# Patient Record
Sex: FEMALE | Race: WHITE | HISPANIC OR LATINO | Employment: OTHER | URBAN - METROPOLITAN AREA
[De-identification: names, ages, dates, MRNs, and addresses within clinical notes are randomized per-mention and may not be internally consistent; named-entity substitution may affect disease eponyms.]

---

## 2023-11-01 ENCOUNTER — OFFICE VISIT (OUTPATIENT)
Dept: PRIMARY CARE | Facility: CLINIC | Age: 73
End: 2023-11-01
Payer: MEDICARE

## 2023-11-01 VITALS
RESPIRATION RATE: 18 BRPM | SYSTOLIC BLOOD PRESSURE: 148 MMHG | HEART RATE: 83 BPM | HEIGHT: 62 IN | OXYGEN SATURATION: 95 % | BODY MASS INDEX: 44.16 KG/M2 | WEIGHT: 240 LBS | DIASTOLIC BLOOD PRESSURE: 88 MMHG | TEMPERATURE: 97.8 F

## 2023-11-01 DIAGNOSIS — Z12.31 ENCOUNTER FOR SCREENING MAMMOGRAM FOR BREAST CANCER: Primary | ICD-10-CM

## 2023-11-01 DIAGNOSIS — I10 PRIMARY HYPERTENSION: ICD-10-CM

## 2023-11-01 DIAGNOSIS — N28.9 RENAL INSUFFICIENCY: ICD-10-CM

## 2023-11-01 DIAGNOSIS — E66.01 CLASS 3 SEVERE OBESITY DUE TO EXCESS CALORIES WITH SERIOUS COMORBIDITY AND BODY MASS INDEX (BMI) OF 40.0 TO 44.9 IN ADULT (MULTI): ICD-10-CM

## 2023-11-01 DIAGNOSIS — R73.9 HYPERGLYCEMIA: ICD-10-CM

## 2023-11-01 DIAGNOSIS — Z13.220 LIPID SCREENING: ICD-10-CM

## 2023-11-01 DIAGNOSIS — Z00.00 ROUTINE GENERAL MEDICAL EXAMINATION AT HEALTH CARE FACILITY: ICD-10-CM

## 2023-11-01 DIAGNOSIS — Z12.11 SCREENING FOR COLON CANCER: ICD-10-CM

## 2023-11-01 PROBLEM — M10.9 GOUT: Status: ACTIVE | Noted: 2023-11-01

## 2023-11-01 PROBLEM — R51.9 HEADACHE: Status: ACTIVE | Noted: 2017-07-21

## 2023-11-01 PROBLEM — E55.9 VITAMIN D DEFICIENCY: Status: ACTIVE | Noted: 2023-11-01

## 2023-11-01 PROBLEM — M54.16 LUMBAR RADICULOPATHY: Status: ACTIVE | Noted: 2023-11-01

## 2023-11-01 PROBLEM — B37.2 MONILIASIS SKIN: Status: ACTIVE | Noted: 2023-11-01

## 2023-11-01 PROBLEM — M1A.00X0 CHRONIC GOUTY ARTHROPATHY: Status: ACTIVE | Noted: 2023-11-01

## 2023-11-01 PROBLEM — H52.13 MYOPIA WITH PRESBYOPIA OF BOTH EYES: Status: ACTIVE | Noted: 2019-04-09

## 2023-11-01 PROBLEM — H43.811 POSTERIOR VITREOUS DETACHMENT OF RIGHT EYE: Status: ACTIVE | Noted: 2019-04-09

## 2023-11-01 PROBLEM — K42.9 UMBILICAL HERNIA: Status: ACTIVE | Noted: 2023-11-01

## 2023-11-01 PROBLEM — Z90.49 ACQUIRED ABSENCE OF OTHER SPECIFIED PARTS OF DIGESTIVE TRACT: Status: ACTIVE | Noted: 2017-07-21

## 2023-11-01 PROBLEM — L72.0 EPIDERMAL INCLUSION CYST: Status: ACTIVE | Noted: 2023-11-01

## 2023-11-01 PROBLEM — R92.8 ABNORMAL MAMMOGRAM: Status: ACTIVE | Noted: 2023-11-01

## 2023-11-01 PROBLEM — H52.4 MYOPIA WITH PRESBYOPIA OF BOTH EYES: Status: ACTIVE | Noted: 2019-04-09

## 2023-11-01 PROBLEM — M1A.9XX0 CHRONIC GOUTY ARTHROPATHY: Status: ACTIVE | Noted: 2023-11-01

## 2023-11-01 PROBLEM — L01.02 PUSTULAR FOLLICULITIS: Status: ACTIVE | Noted: 2023-11-01

## 2023-11-01 PROBLEM — R03.0 ELEVATED BP WITHOUT DIAGNOSIS OF HYPERTENSION: Status: ACTIVE | Noted: 2019-04-30

## 2023-11-01 PROCEDURE — G0439 PPPS, SUBSEQ VISIT: HCPCS | Performed by: PHYSICIAN ASSISTANT

## 2023-11-01 PROCEDURE — 3079F DIAST BP 80-89 MM HG: CPT | Performed by: PHYSICIAN ASSISTANT

## 2023-11-01 PROCEDURE — 1159F MED LIST DOCD IN RCRD: CPT | Performed by: PHYSICIAN ASSISTANT

## 2023-11-01 PROCEDURE — 3077F SYST BP >= 140 MM HG: CPT | Performed by: PHYSICIAN ASSISTANT

## 2023-11-01 PROCEDURE — G0444 DEPRESSION SCREEN ANNUAL: HCPCS | Performed by: PHYSICIAN ASSISTANT

## 2023-11-01 PROCEDURE — 3008F BODY MASS INDEX DOCD: CPT | Performed by: PHYSICIAN ASSISTANT

## 2023-11-01 PROCEDURE — 1170F FXNL STATUS ASSESSED: CPT | Performed by: PHYSICIAN ASSISTANT

## 2023-11-01 PROCEDURE — 1160F RVW MEDS BY RX/DR IN RCRD: CPT | Performed by: PHYSICIAN ASSISTANT

## 2023-11-01 PROCEDURE — 99214 OFFICE O/P EST MOD 30 MIN: CPT | Performed by: PHYSICIAN ASSISTANT

## 2023-11-01 PROCEDURE — 1036F TOBACCO NON-USER: CPT | Performed by: PHYSICIAN ASSISTANT

## 2023-11-01 PROCEDURE — 99397 PER PM REEVAL EST PAT 65+ YR: CPT | Performed by: PHYSICIAN ASSISTANT

## 2023-11-01 ASSESSMENT — ACTIVITIES OF DAILY LIVING (ADL)
GROCERY_SHOPPING: INDEPENDENT
TAKING_MEDICATION: INDEPENDENT
DRESSING: INDEPENDENT
MANAGING_FINANCES: INDEPENDENT
BATHING: INDEPENDENT
DOING_HOUSEWORK: INDEPENDENT

## 2023-11-01 ASSESSMENT — PATIENT HEALTH QUESTIONNAIRE - PHQ9
1. LITTLE INTEREST OR PLEASURE IN DOING THINGS: NOT AT ALL
SUM OF ALL RESPONSES TO PHQ9 QUESTIONS 1 AND 2: 0
2. FEELING DOWN, DEPRESSED OR HOPELESS: NOT AT ALL

## 2023-11-01 ASSESSMENT — ENCOUNTER SYMPTOMS
DEPRESSION: 0
LOSS OF SENSATION IN FEET: 0
OCCASIONAL FEELINGS OF UNSTEADINESS: 0

## 2023-11-01 NOTE — PROGRESS NOTES
"   Subjective   Reason for Visit: Vicenta Rondon is a 73 y.o. female here for a Medicare Wellness visit.     CHECKLIST REVIEWED AND COMPLETE FOR AMW    Past Medical, Surgical, and Family History reviewed and updated in chart.    Reviewed all medications by prescribing practitioner or clinical pharmacist (such as prescriptions, OTCs, herbal therapies and supplements) and documented in the medical record.  Establish Care (New pt here toady to [facility]; previously seen PCP at [facility] but can't remember name-3 years ago. Pt had cyst on back and she went to ER on 8/31/23. Pt's Mother had Uterus Cancer but still living; pt not having any symptoms. Pt had previous biopsy on lump of left breast and needing a Mamm order; last one 3 years ago. Pt has gout in her feet. Pt has fracture in right butt cheek and right sided pinched nerve. )    HPI     HTN:   - BP today: 160/90 mmHg   - Current meds: None   Medication history: She has never been on blood pressure medications, in 2015 doctor recommended blood pressure medications but she declined.    - Monitoring BP at home: She says she checks it at home and it is normally 120-150 systolic, 70s diastolic. Has not recorded BP consistently.  - Caffeine intake: none  - NSAID use: none  - Sodium intake: she does not keep track of sodium intake, but states her diet is \"not great,\" eats out a lot, mother makes her lots of meals  - Activity level: pinched nerve causes her pain and limits her activity level, she is trying to increase her walking   - Complications?: Pt denies orthostatic hypotension, chest pains, palpitations, dizziness, SOB    L Ear Fullness  -Patient reports her L ear feeling \"full\" for the past month. She feels like there is water in it. She denies fever, chills, ear pain, vertigo, hearing loss, or tinnitus. She denies any issues regarding her R ear.    Gout  -stable, patient reports flairs occur only when she eats shrimp     Medicare Wellness:  - Lives in [place] at home " with daughter and son in law   - Good relationship with/ supported by daughter    - Exercise: pinched nerve gives her trouble and limits her exercise ability. She does not exercise regularly but is trying to increase her walking  - Diet: not great, 2 meals per day, mainly eats out.   - Sexual hx: not sexually active currently  - Tobacco: no  - Alcohol: no  - Illicit drugs: no  - Depression/ mood: overall happy  - Cognitive: stable  - Hearing: stable  - Falls: none  - Healthcare POA/ advanced directives: her 3 children  - Code status desired: DNR  - Labs: DUE, ordered today  - DEXA: DUE, declined   - Colon CA: DUE, Cologuard 4 years ago, ordered today  - Mamm: DUE, ordered today  - Flu: DUE, declined  - Shingrix: DUE, declined  - Pneumovax/ Prevnar: DUE, declined  - Td: UTD, due 2025  - COVID-19 vax: DUE, declined   Eye exams: UTD, looking for a new doctor  Dental: DUE, has a cracked tooth currently, needs to find a dentist, she reports brushing 1-2 per day and flossing     Patient Self Assessment of Health Status  - Patient Self Assessment: patient feels healthy     Social support:  - Lives in [place] at home with daughter and son in law   - Good relationship with/ supported by daughter     Nutrition and Exercise  - Current Diet: not great, 2 meals per day, mainly eats out.   - Adequate Fluid Intake: trying to drink more water, limits sugary drinks but she likes her Pepsi  - Caffeine: no  - Alcohol: no  - Exercise Frequency:  pinched nerve gives her trouble and limits her exercise ability. She does not exercise regularly but is trying to increase her walking  Mood: overall happy     Functional Ability/Level of Safety  - Cognitive Impairment Observed? No    Home Safety Risk Factors? Denies     Patient Care Team:  Shasta Greer PA-C as PCP - General (Family Medicine)     HPI  Patient Active Problem List   Diagnosis    Abnormal mammogram    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    Chronic gouty  "arthropathy    Epidermal inclusion cyst    Gout    Headache    Internal hemorrhoids    Lumbar radiculopathy    Moniliasis skin    Myopia with presbyopia of both eyes    Posterior vitreous detachment of right eye    Pustular folliculitis    Umbilical hernia    Vitamin D deficiency    Primary hypertension    Renal insufficiency    Class 3 severe obesity due to excess calories with serious comorbidity and body mass index (BMI) of 40.0 to 44.9 in adult (CMS/[facility])      Current Outpatient Medications   Medication Instructions    ascorbic acid (VITAMIN C ORAL) oral, Daily    cholecalciferol, vitamin D3, (VITAMIN D3 ORAL) oral, Daily    GARLIC ORAL oral, Daily    ZINC ACETATE ORAL Mouth/Throat, Daily      Past Surgical History:   Procedure Laterality Date    CATARACT EXTRACTION Bilateral     Left: 5/20/2019 and right 6/3/2019    CHOLECYSTECTOMY      Cholecystectomy Laparoscopic    CYST REMOVAL         Review of Systems  This patient has   NO history of recent Covid nor flu symptoms,  NO Fever nor chills,  NO Chest pain, shortness of breath nor paroxysmal nocturnal dyspnea,  NO Nausea, vomiting, nor diarrhea,  NO Hematochezia nor melena,  NO Dysuria, hematuria, nor new incontinence issues  NO new severe headaches nor neurological complaints,  NO new issues with anxiety nor depression nor new psychiatric complaints,  NO suicidal nor homicidal ideations.     OBJECTIVE:  /88   Pulse 83   Temp 36.6 °C (97.8 °F)   Resp 18   Ht 1.562 m (5' 1.5\")   Wt 109 kg (240 lb)   SpO2 95%   BMI 44.61 kg/m²      General:  alert, oriented, good hygiene, not disheveled. Well nourished.No obvious skin rashes noted.   Normal gait     Mood is pleasant, not tearful, no signs of emotional distress.  Not appearing intoxicated or altered.   No voiced delusions or paranoia,    Normal, appropriate behavior.    HEENT: Normocephalic, atraumatic,   Pupils round, reactive to light  Extraocular motions intact and wnl  Tympanic membranes " normal    Neck: No masses palpable.  No carotid bruits.  No thyromegaly.    Respiratory: Equal breath sounds  No wheezes, rales, rhonchi  No respiratory distress.    Heart: Regular rate and rhythm, no murmurs      Abdomen: no masses palpable, no tenderness, rebound nor guarding. Ventral hernia, non-tender, easily reducible     Extremities:   B/l lower extremity edema, 2+ pitting   2+dorsalis pedis pulses.       Assessment/Plan     Healthy diet was encouraged  Regular, gentle exercise encouraged   High fall risk, recommended cane/ supportive device with walking.  DUE for final PAP smear - advised she schedule appt   DUE for mamm - ordered today   DUE for colon ca screen - Cologuard ordered today   DUE for DEXA - pt DECLINED  DUE for Pneumovax - DECLINED  DUE for Flu vax - DECLINED   DUE for Shingrix - DECLINED   DUE for COVID-19 vax - DECLINED     Problem List Items Addressed This Visit       Primary hypertension     PRIMARY HYPERTENSION:  - BP in the office today is 148/88 mmHg. This has been a trend for the pt.   - I educated the patient extensively about HTN including the pathophysiology, risks/ complications and treatment goals.   - Pt declines medicien at this point but agrees to keep a log of home BP readings and follow up with me.   - Pt encouraged to monitor BPs at home with automated BP cuff and keep a log. If BPs consistently elevated (>120/>80), consider medicine.  - Pt encouraged to make lifestyle changes such as: reduce salt in the diet, reduce fatty foods, red meat, carbs, sweets, and increase fruits, vegetables, fish, and whole grains.  Reduce NSAID use. Increase physical activity, particularly cardiovascular exercise at least 30 minutes daily. Reduce alcohol intake to small amounts in moderation and avoid cigarette smoking.   - Pt advised to f/u for reevaluation in 1 month.          Relevant Orders    Comprehensive Metabolic Panel    Albumin , Urine Random    Renal insufficiency     - Found  incidentally when she was at the hospital for an abscess. Unsure if chronic issue or was ROSETTA   - Needs repeat blood work - orders placed  - Encouraged more water/ better hydration          Relevant Orders    Comprehensive Metabolic Panel    Albumin , Urine Random    Class 3 severe obesity due to excess calories with serious comorbidity and body mass index (BMI) of 40.0 to 44.9 in adult (CMS/[facility])     OBESITY  - BMI is 44.61 kg/m2  - Other cardiac risk factors: HTN, hyperglycemia  - Pt is aware of the health benefits of lower body weight and the risks associated with obesity.   - Pt advised to increase physical activity with a goal of 30 minutes of cardiovascular/ aerobic exercise daily.   - Pt advised to improve the diet - less fatty foods, red meat and sweets,  more fruits, vegetables, fish, and whole grains.   - All the pt's questions were answered. The pt expressed understanding and agreed to the plan.           Other Visit Diagnoses       Encounter for screening mammogram for breast cancer    -  Primary    Relevant Orders    BI mammo bilateral screening tomosynthesis    Screening for colon cancer        Relevant Orders    Cologuard® colon cancer screening    Lipid screening        Relevant Orders    Lipid Panel    Hyperglycemia        Relevant Orders    Hemoglobin A1C    Routine general medical examination at health care facility        Relevant Orders    1 Year Follow Up In [facility] - PCP - Wellness Exam            Hypertension  -Risks of elevated BP were discussed with patient, eg MI, CHF, CVA  -Treatment options were discussed, including medication, weight loss, exercise, sodium restriction, increased water intake, etc.  - Pt DECLINED any medication for now and prefers to try lifestyle modification   -Patient was advised to record BP readings at home for a consecutive 2 weeks and schedule another appointment to discuss the results    L ear fullness  -Cerumen impaction likely causing symptoms of  ear fullness  -Debrox or peroxide/water mixture was recommended to patient for softening of cerumen  -Will continue to monitor for symptoms at next visit    Hyperglycemia  -Recent result of elevated glucose (200) was discussed with patient, which was from a recent hospital visit for an abscess.   -Elevated glucose may be attributed to the recent skin infection she had or may be more chronic glucose elevation  -Hemoglobin A1C ordered today    Renal Insufficiency  -GFR of 41 was recorded during her recent hospital visit  -Repeat CMP and an urine albumin were ordered today      Follow up at least annually -sooner if condition deteriorates or new problems arise.  Patient was identified as a fall risk. Risk prevention instructions provided.

## 2023-11-01 NOTE — PROGRESS NOTES
Subjective   Reason for Visit: Vicenta Rondon is a 73 y.o. female here for a Medicare Wellness visit.     CHECKLIST REVIEWED AND COMPLETE FOR AMW    Past Medical, Surgical, and Family History reviewed and updated in chart.    Reviewed all medications by prescribing practitioner or clinical pharmacist (such as prescriptions, OTCs, herbal therapies and supplements) and documented in the medical record.  Establish Care (New pt here [facility] to [facility]; previously seen PCP at [facility] but can't remember name-3 years ago. Pt had cyst on back and she went to ER on 8/31/23. Pt's Mother had Uterus Cancer but still living; pt not having any symptoms. Pt had previous biopsy on lump of left breast and needing a Mamm order; last one 3 years ago. Pt has gout in her feet. Pt has fracture in right butt cheek and right sided pinched nerve. )    HPI    HTN:   - BP today: 160/90 mmHg   - Current meds:   - Compliant:   - Monitoring BP at home:   - Caffeine intake:   - NSAID use:   - Sodium intake:  - Activity level:   - Complications?: Pt denies orthostatic hypotension, chest pains, palpitations, dizziness, SOB, lower extremity edema     Medicare Wellness:  - Lives at home in -- with --   - Hobbies/ activities:   - Exercise:   - Diet:   - Sexual hx:   - Tobacco:   - Alcohol:  - Illicit drugs:   - Depression/ mood:   - Cognitive:   - Hearing:   - Falls:   - Healthcare POA/ advanced directives:  - Code status desired:   - Labs:   - DEXA:   - Colon CA:  - Mamm:  - Flu:  - Shingrix:  - Pneumovax/ Prevnar:  - Td:  - COVID-19 vax:   - Lung CA screen (Smoker):                   Patient Self Assessment of Health Status  - Patient Self Assessment: ***     Social support:  - Lives *** with ***   - Good relationship with/ supported by ***     Nutrition and Exercise  - Current Diet: ***  - Adequate Fluid Intake: ***  - Caffeine: ***  - Alcohol: ***  - Exercise Frequency: ***    Functional Ability/Level of Safety  - Cognitive Impairment Observed?  "***    Home Safety Risk Factors? ***    Patient Care Team:  Shasta Greer PA-C as PCP - General (Family Medicine)     HPI  Patient Active Problem List   Diagnosis    Abnormal mammogram    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    Chronic gouty arthropathy    Elevated BP without diagnosis of hypertension    Epidermal inclusion cyst    Gout    Headache    Internal hemorrhoids    Lumbar radiculopathy    Moniliasis skin    Myopia with presbyopia of both eyes    Posterior vitreous detachment of right eye    Pustular folliculitis    Umbilical hernia    Vitamin D deficiency      Current Outpatient Medications   Medication Instructions    ascorbic acid (VITAMIN C ORAL) oral, Daily    cholecalciferol, vitamin D3, (VITAMIN D3 ORAL) oral, Daily    GARLIC ORAL oral, Daily    ZINC ACETATE ORAL Mouth/Throat, Daily      Past Surgical History:   Procedure Laterality Date    CATARACT EXTRACTION Bilateral     Left: 5/20/2019 and right 6/3/2019    CHOLECYSTECTOMY      Cholecystectomy Laparoscopic    CYST REMOVAL         Review of Systems  This patient has   NO history of recent Covid nor flu symptoms,  NO Fever nor chills,  NO Chest pain, shortness of breath nor paroxysmal nocturnal dyspnea,  NO Nausea, vomiting, nor diarrhea,  NO Hematochezia nor melena,  NO Dysuria, hematuria, nor new incontinence issues  NO new severe headaches nor neurological complaints,  NO new issues with anxiety nor depression nor new psychiatric complaints,  NO suicidal nor homicidal ideations.     OBJECTIVE:  /90   Pulse 83   Temp 36.6 °C (97.8 °F)   Resp 18   Ht 1.562 m (5' 1.5\")   Wt 109 kg (240 lb)   SpO2 95%   BMI 44.61 kg/m²      General:  alert, oriented, good hygiene, not disheveled. Well nourished. *** thin, frail, weak/ ill-appearing  No obvious skin rashes noted.   Antalgic gait ***(not) observed     Mood is pleasant, not tearful, no signs of emotional distress.  Not appearing intoxicated or altered.   No voiced " delusions or paranoia,    Normal, appropriate behavior.    HEENT: Normocephalic, atraumatic,   Pupils round, reactive to light  Extraocular motions intact and wnl  Tympanic membranes normal    Neck: No masses palpable.  No carotid bruits.  No thyromegaly.    Respiratory: Equal breath sounds  No wheezes, rales, rhonchi  No respiratory distress.    Heart: Regular rate and rhythm, no *** murmurs      Abdomen: no masses palpable, no tenderness, rebound nor guarding. *** no hernia or surgical scars    Extremities:   *** B/l lower extremity edema *** No edema   2+dorsalis pedis pulses.       Assessment/Plan     Healthy diet was encouraged  Regular, gentle exercise encouraged       Problem List Items Addressed This Visit    None    Advance Care Planning Note   Discussion Date: 11/01/23   Discussion Participants: patient    The patient wishes to discuss Advance Care Planning today and the following is a brief summary of our discussion.     Patient has capacity to make their own medical decisions: Yes  Health Care Agent/Surrogate Decision Maker documented in chart: Yes  Documents on file and valid:  Advance Directive/Living Will: Yes   Health Care Power of : Yes  Communication of Medical Status/Prognosis:   yes   Communication of Treatment Goals/Options:   yes  Treatment Decisions  yes  Time Statement: Total face to face time spent on advance care planning was <30 minutes with <30 minutes spent in counseling, including the explanation.    Follow up at least annually -sooner if condition deteriorates or new problems arise.

## 2023-11-01 NOTE — ASSESSMENT & PLAN NOTE
-Risks of elevated BP were discussed with patient, eg MI, CHF, CVA  -Treatment options were discussed, including medication, weight loss, exercise, sodium restriction, increased water intake, etc.  -Patient was advised to record BP readings at home for a consecutive 2 weeks and schedule another appointment with me to discuss the results

## 2023-11-02 PROBLEM — E66.01 CLASS 3 SEVERE OBESITY DUE TO EXCESS CALORIES WITH SERIOUS COMORBIDITY AND BODY MASS INDEX (BMI) OF 40.0 TO 44.9 IN ADULT (MULTI): Status: ACTIVE | Noted: 2023-11-02

## 2023-11-02 PROBLEM — E66.813 CLASS 3 SEVERE OBESITY DUE TO EXCESS CALORIES WITH SERIOUS COMORBIDITY AND BODY MASS INDEX (BMI) OF 40.0 TO 44.9 IN ADULT: Status: ACTIVE | Noted: 2023-11-02

## 2023-11-02 PROBLEM — N28.9 RENAL INSUFFICIENCY: Status: ACTIVE | Noted: 2023-11-02

## 2023-11-02 PROBLEM — R03.0 ELEVATED BP WITHOUT DIAGNOSIS OF HYPERTENSION: Status: RESOLVED | Noted: 2019-04-30 | Resolved: 2023-11-02

## 2023-11-02 NOTE — ASSESSMENT & PLAN NOTE
- Found incidentally when she was at the hospital for an abscess. Unsure if chronic issue or was ROSETTA   - Needs repeat blood work - orders placed  - Encouraged more water/ better hydration

## 2023-11-02 NOTE — PATIENT INSTRUCTIONS
Current weight: 109 kg (240 lb)  Weight change since last visit (-) denotes wt loss 240 lbs   Weight loss needed to achieve BMI 25: 105.8 Lbs  Weight loss needed to achieve BMI 30: 78.9 Lbs          Ways to Help Prevent Falls at Home    Quick Tips   ? Ask for help if you need it. Most people want to help!   ? Get up slowly after sitting or laying down   ? Wear a medical alert device or keep cell phone in your pocket   ? Use night lights, especially areas near a bathroom   ? Keep the items you use often within reach on a small stool or end table   ? Use an assistive device such as walker or cane, as directed by provider/physical therapy   ? Use a non-slip mat and grab bars in your bathroom. Look for home health sections for best options     Other Areas to Focus On   ? Exercise and nutrition: Regular exercise or taking a falls prevention class are great ways improve strength and balance. Don’t forget to stay hydrated and bring a snack!   ? Medicine side effects: Some medicines can make you sleepy or dizzy, which could cause a fall. Ask your healthcare provider about the side effects your medicines could cause. Be sure to let them know if you take any vitamins or supplements as well.   ? Tripping hazards: Remove items you could trip on, such as loose mats, rugs, cords, and clutter. Wear closed toe shoes with rubber soles.   ? Health and wellness: Get regular checkups with your healthcare provider, plus routine vision and hearing screenings. Talk with your healthcare provider about:   o Your medicines and the possible side effects - bring them in a bag if that is easier!   o Problems with balance or feeling dizzy   o Ways to promote bone health, such as Vitamin D and calcium supplements   o Questions or concerns about falling     *Ask your healthcare team if you have questions     [facility], 2022

## 2023-11-02 NOTE — ASSESSMENT & PLAN NOTE
OBESITY  - BMI is 44.61 kg/m2  - Other cardiac risk factors: HTN, hyperglycemia  - Pt is aware of the health benefits of lower body weight and the risks associated with obesity.   - Pt advised to increase physical activity with a goal of 30 minutes of cardiovascular/ aerobic exercise daily.   - Pt advised to improve the diet - less fatty foods, red meat and sweets,  more fruits, vegetables, fish, and whole grains.   - All the pt's questions were answered. The pt expressed understanding and agreed to the plan.

## 2023-11-02 NOTE — ASSESSMENT & PLAN NOTE
PRIMARY HYPERTENSION:  - BP in the office today is 148/88 mmHg. This has been a trend for the pt.   - I educated the patient extensively about HTN including the pathophysiology, risks/ complications and treatment goals.   - Pt declines medicien at this point but agrees to keep a log of home BP readings and follow up with me.   - Pt encouraged to monitor BPs at home with automated BP cuff and keep a log. If BPs consistently elevated (>120/>80), consider medicine.  - Pt encouraged to make lifestyle changes such as: reduce salt in the diet, reduce fatty foods, red meat, carbs, sweets, and increase fruits, vegetables, fish, and whole grains.  Reduce NSAID use. Increase physical activity, particularly cardiovascular exercise at least 30 minutes daily. Reduce alcohol intake to small amounts in moderation and avoid cigarette smoking.   - Pt advised to f/u for reevaluation in 1 month.

## 2023-11-23 LAB — NONINV COLON CA DNA+OCC BLD SCRN STL QL: NEGATIVE

## 2024-01-06 ENCOUNTER — LAB (OUTPATIENT)
Dept: LAB | Facility: LAB | Age: 74
End: 2024-01-06
Payer: MEDICARE

## 2024-01-06 DIAGNOSIS — N28.9 RENAL INSUFFICIENCY: ICD-10-CM

## 2024-01-06 DIAGNOSIS — Z13.220 LIPID SCREENING: ICD-10-CM

## 2024-01-06 DIAGNOSIS — I10 PRIMARY HYPERTENSION: ICD-10-CM

## 2024-01-06 DIAGNOSIS — R73.9 HYPERGLYCEMIA: ICD-10-CM

## 2024-01-06 LAB
ALBUMIN SERPL BCP-MCNC: 3.8 G/DL (ref 3.4–5)
ALP SERPL-CCNC: 85 U/L (ref 33–136)
ALT SERPL W P-5'-P-CCNC: 13 U/L (ref 7–45)
ANION GAP SERPL CALC-SCNC: 13 MMOL/L (ref 10–20)
AST SERPL W P-5'-P-CCNC: 12 U/L (ref 9–39)
BILIRUB SERPL-MCNC: 0.6 MG/DL (ref 0–1.2)
BUN SERPL-MCNC: 12 MG/DL (ref 6–23)
CALCIUM SERPL-MCNC: 8.7 MG/DL (ref 8.6–10.3)
CHLORIDE SERPL-SCNC: 103 MMOL/L (ref 98–107)
CHOLEST SERPL-MCNC: 207 MG/DL (ref 0–199)
CHOLESTEROL/HDL RATIO: 4.2
CO2 SERPL-SCNC: 28 MMOL/L (ref 21–32)
CREAT SERPL-MCNC: 1.02 MG/DL (ref 0.5–1.05)
CREAT UR-MCNC: 172.3 MG/DL (ref 20–320)
EST. AVERAGE GLUCOSE BLD GHB EST-MCNC: 180 MG/DL
GFR SERPL CREATININE-BSD FRML MDRD: 59 ML/MIN/1.73M*2
GLUCOSE SERPL-MCNC: 155 MG/DL (ref 74–99)
HBA1C MFR BLD: 7.9 %
HDLC SERPL-MCNC: 49 MG/DL
LDLC SERPL CALC-MCNC: 133 MG/DL
MICROALBUMIN UR-MCNC: 266.3 MG/L
MICROALBUMIN/CREAT UR: 154.6 UG/MG CREAT
NON HDL CHOLESTEROL: 158 MG/DL (ref 0–149)
POTASSIUM SERPL-SCNC: 4.1 MMOL/L (ref 3.5–5.3)
PROT SERPL-MCNC: 7.1 G/DL (ref 6.4–8.2)
SODIUM SERPL-SCNC: 140 MMOL/L (ref 136–145)
TRIGL SERPL-MCNC: 124 MG/DL (ref 0–149)
VLDL: 25 MG/DL (ref 0–40)

## 2024-01-06 PROCEDURE — 80053 COMPREHEN METABOLIC PANEL: CPT

## 2024-01-06 PROCEDURE — 82570 ASSAY OF URINE CREATININE: CPT

## 2024-01-06 PROCEDURE — 82043 UR ALBUMIN QUANTITATIVE: CPT

## 2024-01-06 PROCEDURE — 36415 COLL VENOUS BLD VENIPUNCTURE: CPT

## 2024-01-06 PROCEDURE — 83036 HEMOGLOBIN GLYCOSYLATED A1C: CPT

## 2024-01-06 PROCEDURE — 80061 LIPID PANEL: CPT

## 2024-01-08 ENCOUNTER — PROCEDURE VISIT (OUTPATIENT)
Dept: PRIMARY CARE | Facility: CLINIC | Age: 74
End: 2024-01-08
Payer: MEDICARE

## 2024-01-08 ENCOUNTER — TELEPHONE (OUTPATIENT)
Dept: PRIMARY CARE | Facility: CLINIC | Age: 74
End: 2024-01-08

## 2024-01-08 VITALS
BODY MASS INDEX: 44.35 KG/M2 | WEIGHT: 241 LBS | HEIGHT: 62 IN | SYSTOLIC BLOOD PRESSURE: 162 MMHG | OXYGEN SATURATION: 96 % | TEMPERATURE: 97.8 F | DIASTOLIC BLOOD PRESSURE: 84 MMHG | HEART RATE: 94 BPM | RESPIRATION RATE: 18 BRPM

## 2024-01-08 DIAGNOSIS — N18.2 TYPE 2 DIABETES MELLITUS WITH STAGE 2 CHRONIC KIDNEY DISEASE, WITHOUT LONG-TERM CURRENT USE OF INSULIN (MULTI): ICD-10-CM

## 2024-01-08 DIAGNOSIS — E11.69 HYPERLIPIDEMIA ASSOCIATED WITH TYPE 2 DIABETES MELLITUS (MULTI): Primary | ICD-10-CM

## 2024-01-08 DIAGNOSIS — Z12.4 CERVICAL CANCER SCREENING: ICD-10-CM

## 2024-01-08 DIAGNOSIS — E78.5 HYPERLIPIDEMIA ASSOCIATED WITH TYPE 2 DIABETES MELLITUS (MULTI): Primary | ICD-10-CM

## 2024-01-08 DIAGNOSIS — N18.2 CKD STAGE G2/A2, GFR 60-89 AND ALBUMIN CREATININE RATIO 30-299 MG/G: ICD-10-CM

## 2024-01-08 DIAGNOSIS — I15.2 HYPERTENSION ASSOCIATED WITH TYPE 2 DIABETES MELLITUS (MULTI): ICD-10-CM

## 2024-01-08 DIAGNOSIS — E11.59 HYPERTENSION ASSOCIATED WITH TYPE 2 DIABETES MELLITUS (MULTI): ICD-10-CM

## 2024-01-08 DIAGNOSIS — I10 PRIMARY HYPERTENSION: ICD-10-CM

## 2024-01-08 DIAGNOSIS — E11.22 TYPE 2 DIABETES MELLITUS WITH STAGE 2 CHRONIC KIDNEY DISEASE, WITHOUT LONG-TERM CURRENT USE OF INSULIN (MULTI): ICD-10-CM

## 2024-01-08 DIAGNOSIS — E11.65 TYPE 2 DIABETES MELLITUS WITH HYPERGLYCEMIA, WITHOUT LONG-TERM CURRENT USE OF INSULIN (MULTI): ICD-10-CM

## 2024-01-08 DIAGNOSIS — Z12.4 PAP SMEAR FOR CERVICAL CANCER SCREENING: ICD-10-CM

## 2024-01-08 PROBLEM — Z00.00 ANNUAL PHYSICAL EXAM: Status: ACTIVE | Noted: 2024-01-08

## 2024-01-08 PROCEDURE — 87624 HPV HI-RISK TYP POOLED RSLT: CPT

## 2024-01-08 PROCEDURE — 88175 CYTOPATH C/V AUTO FLUID REDO: CPT

## 2024-01-08 PROCEDURE — 99214 OFFICE O/P EST MOD 30 MIN: CPT | Performed by: PHYSICIAN ASSISTANT

## 2024-01-08 RX ORDER — TIRZEPATIDE 2.5 MG/.5ML
2.5 INJECTION, SOLUTION SUBCUTANEOUS
Qty: 2 ML | Refills: 0 | Status: SHIPPED | OUTPATIENT
Start: 2024-01-08 | End: 2024-04-15 | Stop reason: SDDI

## 2024-01-08 RX ORDER — DEXTROMETHORPHAN HYDROBROMIDE, GUAIFENESIN 5; 100 MG/5ML; MG/5ML
650 LIQUID ORAL EVERY 8 HOURS PRN
COMMUNITY

## 2024-01-08 RX ORDER — ACETAMINOPHEN 500 MG
TABLET ORAL
Qty: 1 KIT | Refills: 0 | Status: SHIPPED | OUTPATIENT
Start: 2024-01-08 | End: 2024-04-15 | Stop reason: WASHOUT

## 2024-01-08 RX ORDER — ACETAMINOPHEN 500 MG
TABLET ORAL
COMMUNITY
End: 2024-01-08 | Stop reason: SDUPTHER

## 2024-01-08 RX ORDER — DAPAGLIFLOZIN 10 MG/1
TABLET, FILM COATED ORAL
Qty: 90 TABLET | Refills: 0 | Status: SHIPPED | OUTPATIENT
Start: 2024-01-08 | End: 2024-04-15

## 2024-01-08 RX ORDER — NEBULIZER AND COMPRESSOR
EACH MISCELLANEOUS
Qty: 1 EACH | Refills: 0 | Status: SHIPPED | OUTPATIENT
Start: 2024-01-08 | End: 2024-04-15 | Stop reason: WASHOUT

## 2024-01-08 ASSESSMENT — ENCOUNTER SYMPTOMS: DYSURIA: 0

## 2024-01-08 NOTE — ASSESSMENT & PLAN NOTE
- Educated about risks of poorly controlled HLD especailly in the setting of T2DM increase cardiovascular risk   - Recommended lifestyle modifications - low cholesterol diet   - Offered dietician referral - pt declined   - Recommended statin - pt refused AMA   - Hopefully will reconsider if cholesterol remains high despite her other lifestyle modifications.

## 2024-01-08 NOTE — ASSESSMENT & PLAN NOTE
- I educated the pt about the dx and tx options  - Emphasized the importance of good glycemic control   - Will add Farxiga

## 2024-01-08 NOTE — ASSESSMENT & PLAN NOTE
- Pt has been attempting lifestyle modifications - she brought 1 week worth of home readings today - SBPs in 150 range consistently.   - I educated about the importance of good BP control especailly for prevention of cardiovascular complications e.g. MI, CVA, CKD   - Pt still resistant to medications - she tells me she will reconsider at follow up visit in 3 months if still high

## 2024-01-08 NOTE — ASSESSMENT & PLAN NOTE
DIABETES MELLITUS, TYPE 2:  - I had a lengthy discussion with the pt explaining diabetes and ensuring that she understands her diagnosis and the reason for tx and monitoring.   - Hgb A1c is  TOO HIGH. I discussed this with the pt and the risks of uncontrolled DM including kidney disease, heart attacks, retinopathy, stroke, neuropathy.   - Recommended metformin - pt DECLINED  - Will start Mounjaro and Farxiga - pt agreeable to both of these   - Pt is advised to modify the diet (less carbs, less sugary) and to increase cardiovascular exercise.   - Pt DECLINED referral to dietician to discuss diabetic diet in more detail.   - Pt should f/u in 3 months

## 2024-01-08 NOTE — ASSESSMENT & PLAN NOTE
- Pt agreeable to starting Mounjaro and Farxiga - hopefully will comply (h/o non-compliance)   - Advised she follow up with me in 3 months with labs prior (UACr, A1c, BMP)

## 2024-01-08 NOTE — PROGRESS NOTES
"Subjective   Patient ID: Vicenta Rondon is a 73 y.o. female who presents for Gynecologic Exam (Pt here today for a PAP) and Follow-up (discuss labs and check BP; pt brought her cuff into office; left arm in office is 170/74).    HPI     T2DM:  - A1c was 7.9%   - New dx for the pt  - Pt tells me she was on metformin in the past but couldn't tolerate due to GI upset   - needs better diet     HLD:  - needs better diet     Menstrual history:  - Age at menopause (if applicable): 47yo   - Saw some postmenopausal spotting yesterday - this doesn't happen often     Sexual history:  - Not active since her       PAP  - Last PAP: 40 years ago   - Abnormalities: none     Symptoms:  - Pelvic pain: no   - Abnormal discharge: no   - Vaginal itching: no   - Dysuria:   - Pain or bleeding with intercourse:     Breast:  - Mamm is scheduled for tomorrow       Review of Systems   Genitourinary:  Negative for dysuria, menstrual problem, pelvic pain, vaginal bleeding, vaginal discharge and vaginal pain.       Objective   /84   Pulse 94   Temp 36.6 °C (97.8 °F)   Resp 18   Ht 1.562 m (5' 1.5\")   Wt 109 kg (241 lb)   SpO2 96%   BMI 44.80 kg/m²     Physical Exam  Exam conducted with a chaperone present (Justa San Juan MA).   Constitutional:       Appearance: She is obese.   Genitourinary:     General: Normal vulva.      Exam position: Lithotomy position.      Pubic Area: No rash.       Urethra: No prolapse.      Vagina: Vaginal discharge present.      Cervix: Normal.   Neurological:      Mental Status: She is alert.         Assessment/Plan     Problem List Items Addressed This Visit       Hypertension associated with type 2 diabetes mellitus (CMS/HCC)    Overview     - Pt DECLINES MED          Current Assessment & Plan     - Pt has been attempting lifestyle modifications - she brought 1 week worth of home readings today - SBPs in 150 range consistently.   - I educated about the importance of good BP control especailly " for prevention of cardiovascular complications e.g. MI, CVA, CKD   - Pt still resistant to medications - she tells me she will reconsider at follow up visit in 3 months if still high          Relevant Medications    miscellaneous medical supply (Blood Pressure Cuff) misc    Type 2 diabetes mellitus with hyperglycemia, without long-term current use of insulin (CMS/[facility])    Overview     - Most recent A1c was 7.9% (1/6/23)   - Historical med: metformin (GI upset)  - Most recent eGFR was 59 (1/6/23)  - Most recent UACr was 154.6 (1/6/23)  - NOT on aspirin   - NOT on statin (declined)  - NOT on ACE-I (declined)            Current Assessment & Plan     DIABETES MELLITUS, TYPE 2:  - I had a lengthy discussion with the pt explaining diabetes and ensuring that she understands her diagnosis and the reason for tx and monitoring.   - Hgb A1c is  TOO HIGH. I discussed this with the pt and the risks of uncontrolled DM including kidney disease, heart attacks, retinopathy, stroke, neuropathy.   - Recommended metformin - pt DECLINED  - Will start Mounjaro and Farxiga - pt agreeable to both of these   - Pt is advised to modify the diet (less carbs, less sugary) and to increase cardiovascular exercise.   - Pt DECLINED referral to dietician to discuss diabetic diet in more detail.   - Pt should f/u in 3 months          Relevant Medications    dapagliflozin propanediol (Farxiga) 10 mg    tirzepatide (Mounjaro) 2.5 mg/0.5 mL pen injector    Other Relevant Orders    Follow Up In [facility] - PCP    Albumin , Urine Random    Hemoglobin A1C    Basic Metabolic Panel    Hyperlipidemia associated with type 2 diabetes mellitus (CMS/[facility]) - Primary    Overview     - Most recent , Hdl 49.0, ratio 4.2, trigs 124 (1/6/23)  - PT DECLINES STATIN         Current Assessment & Plan     - Educated about risks of poorly controlled HLD especailly in the setting of T2DM increase cardiovascular risk   - Recommended lifestyle modifications -  low cholesterol diet   - Offered dietician referral - pt declined   - Recommended statin - pt refused AMA   - Hopefully will reconsider if cholesterol remains high despite her other lifestyle modifications.          Relevant Orders    Follow Up In [facility] - PCP    CKD stage G2/A2, GFR 60-89 and albumin creatinine ratio  mg/g    Overview     - Most recent UACr was 154.6 (23)  - Most recent eGFR was 59 (23)         Current Assessment & Plan     - I educated the pt about the dx and tx options  - Emphasized the importance of good glycemic control   - Will add Farxiga          Type 2 diabetes mellitus with stage 2 chronic kidney disease, without long-term current use of insulin (CMS/[facility])    Overview     - Most recent A1c was 7.9% (23)   - Most recent UACr was 154.6 (23)  - Most recent eGFR was 59 (23)         Current Assessment & Plan     - Pt agreeable to starting Mounjaro and Farxiga - hopefully will comply (h/o non-compliance)   - Advised she follow up with me in 3 months with labs prior (UACr, A1c, BMP)         Relevant Orders    Albumin , Urine Random    Hemoglobin A1C    Basic Metabolic Panel     Other Visit Diagnoses       Pap smear for cervical cancer screening        Cervical cancer screening        Relevant Orders    THINPREP PAP TEST            SCREENING PAP SMEAR:   - Pt wasn't compliant with routine PAPs and wants to do one final to make sure ok   - Postmenopausal    - Sexual hx - no longer active since   a few years ago   - Today, unremarkable pelvic exam.   - PAP was performed and sent for pathology  with HPV testing  - All the pt's questions were answered.   - Will call the pt with updated plan when all results become available.    Pt advised to anticipate results communication within 1-2 weeks, if not, should call our office to inquire.

## 2024-01-09 ENCOUNTER — HOSPITAL ENCOUNTER (OUTPATIENT)
Dept: RADIOLOGY | Facility: CLINIC | Age: 74
Discharge: HOME | End: 2024-01-09
Payer: MEDICARE

## 2024-01-09 DIAGNOSIS — Z12.31 ENCOUNTER FOR SCREENING MAMMOGRAM FOR BREAST CANCER: ICD-10-CM

## 2024-01-09 PROCEDURE — 77067 SCR MAMMO BI INCL CAD: CPT | Performed by: RADIOLOGY

## 2024-01-09 PROCEDURE — 77067 SCR MAMMO BI INCL CAD: CPT

## 2024-01-09 PROCEDURE — 77063 BREAST TOMOSYNTHESIS BI: CPT | Performed by: RADIOLOGY

## 2024-01-11 ENCOUNTER — HOSPITAL ENCOUNTER (OUTPATIENT)
Dept: RADIOLOGY | Facility: EXTERNAL LOCATION | Age: 74
Discharge: HOME | End: 2024-01-11

## 2024-01-19 LAB
CYTOLOGY CMNT CVX/VAG CYTO-IMP: NORMAL
HPV HR 12 DNA GENITAL QL NAA+PROBE: NEGATIVE
HPV HR GENOTYPES PNL CVX NAA+PROBE: NEGATIVE
HPV16 DNA SPEC QL NAA+PROBE: NEGATIVE
HPV18 DNA SPEC QL NAA+PROBE: NEGATIVE
LAB AP HPV GENOTYPE QUESTION: YES
LAB AP HPV HR: NORMAL
LABORATORY COMMENT REPORT: NORMAL
PATH REPORT.TOTAL CANCER: NORMAL

## 2024-04-08 ENCOUNTER — APPOINTMENT (OUTPATIENT)
Dept: PRIMARY CARE | Facility: CLINIC | Age: 74
End: 2024-04-08
Payer: MEDICARE

## 2024-04-15 ENCOUNTER — OFFICE VISIT (OUTPATIENT)
Dept: PRIMARY CARE | Facility: CLINIC | Age: 74
End: 2024-04-15
Payer: MEDICARE

## 2024-04-15 VITALS
OXYGEN SATURATION: 96 % | WEIGHT: 226 LBS | BODY MASS INDEX: 41.59 KG/M2 | HEIGHT: 62 IN | DIASTOLIC BLOOD PRESSURE: 78 MMHG | RESPIRATION RATE: 18 BRPM | SYSTOLIC BLOOD PRESSURE: 128 MMHG | TEMPERATURE: 97.8 F | HEART RATE: 102 BPM

## 2024-04-15 DIAGNOSIS — Z71.89 ADVANCED CARE PLANNING/COUNSELING DISCUSSION: Primary | ICD-10-CM

## 2024-04-15 DIAGNOSIS — Z71.89 CARDIAC RISK COUNSELING: ICD-10-CM

## 2024-04-15 DIAGNOSIS — Z00.00 ANNUAL PHYSICAL EXAM: ICD-10-CM

## 2024-04-15 DIAGNOSIS — E11.69 HYPERLIPIDEMIA ASSOCIATED WITH TYPE 2 DIABETES MELLITUS (MULTI): ICD-10-CM

## 2024-04-15 DIAGNOSIS — I15.2 HYPERTENSION ASSOCIATED WITH TYPE 2 DIABETES MELLITUS (MULTI): ICD-10-CM

## 2024-04-15 DIAGNOSIS — Z13.89 SCREENING FOR MULTIPLE CONDITIONS: ICD-10-CM

## 2024-04-15 DIAGNOSIS — E66.01 CLASS 3 SEVERE OBESITY DUE TO EXCESS CALORIES WITH SERIOUS COMORBIDITY AND BODY MASS INDEX (BMI) OF 40.0 TO 44.9 IN ADULT (MULTI): ICD-10-CM

## 2024-04-15 DIAGNOSIS — E78.5 HYPERLIPIDEMIA ASSOCIATED WITH TYPE 2 DIABETES MELLITUS (MULTI): ICD-10-CM

## 2024-04-15 DIAGNOSIS — E11.59 HYPERTENSION ASSOCIATED WITH TYPE 2 DIABETES MELLITUS (MULTI): ICD-10-CM

## 2024-04-15 DIAGNOSIS — N18.2 TYPE 2 DIABETES MELLITUS WITH STAGE 2 CHRONIC KIDNEY DISEASE, WITHOUT LONG-TERM CURRENT USE OF INSULIN (MULTI): ICD-10-CM

## 2024-04-15 DIAGNOSIS — M1A.00X0 CHRONIC GOUTY ARTHROPATHY: ICD-10-CM

## 2024-04-15 DIAGNOSIS — E11.65 TYPE 2 DIABETES MELLITUS WITH HYPERGLYCEMIA, WITHOUT LONG-TERM CURRENT USE OF INSULIN (MULTI): ICD-10-CM

## 2024-04-15 DIAGNOSIS — N18.2 CKD STAGE G2/A2, GFR 60-89 AND ALBUMIN CREATININE RATIO 30-299 MG/G: ICD-10-CM

## 2024-04-15 DIAGNOSIS — E11.22 TYPE 2 DIABETES MELLITUS WITH STAGE 2 CHRONIC KIDNEY DISEASE, WITHOUT LONG-TERM CURRENT USE OF INSULIN (MULTI): ICD-10-CM

## 2024-04-15 PROBLEM — R92.8 ABNORMAL MAMMOGRAM: Status: RESOLVED | Noted: 2023-11-01 | Resolved: 2024-04-15

## 2024-04-15 LAB — POC HEMOGLOBIN A1C: 7.1 % (ref 4.2–6.5)

## 2024-04-15 PROCEDURE — G0444 DEPRESSION SCREEN ANNUAL: HCPCS | Performed by: PHYSICIAN ASSISTANT

## 2024-04-15 PROCEDURE — 3078F DIAST BP <80 MM HG: CPT | Performed by: PHYSICIAN ASSISTANT

## 2024-04-15 PROCEDURE — 3051F HG A1C>EQUAL 7.0%<8.0%: CPT | Performed by: PHYSICIAN ASSISTANT

## 2024-04-15 PROCEDURE — 3074F SYST BP LT 130 MM HG: CPT | Performed by: PHYSICIAN ASSISTANT

## 2024-04-15 PROCEDURE — 83036 HEMOGLOBIN GLYCOSYLATED A1C: CPT | Performed by: PHYSICIAN ASSISTANT

## 2024-04-15 PROCEDURE — 3008F BODY MASS INDEX DOCD: CPT | Performed by: PHYSICIAN ASSISTANT

## 2024-04-15 PROCEDURE — G0446 INTENS BEHAVE THER CARDIO DX: HCPCS | Performed by: PHYSICIAN ASSISTANT

## 2024-04-15 PROCEDURE — 1170F FXNL STATUS ASSESSED: CPT | Performed by: PHYSICIAN ASSISTANT

## 2024-04-15 PROCEDURE — 3050F LDL-C >= 130 MG/DL: CPT | Performed by: PHYSICIAN ASSISTANT

## 2024-04-15 PROCEDURE — 3060F POS MICROALBUMINURIA REV: CPT | Performed by: PHYSICIAN ASSISTANT

## 2024-04-15 PROCEDURE — 99497 ADVNCD CARE PLAN 30 MIN: CPT | Performed by: PHYSICIAN ASSISTANT

## 2024-04-15 PROCEDURE — 1123F ACP DISCUSS/DSCN MKR DOCD: CPT | Performed by: PHYSICIAN ASSISTANT

## 2024-04-15 PROCEDURE — 99214 OFFICE O/P EST MOD 30 MIN: CPT | Performed by: PHYSICIAN ASSISTANT

## 2024-04-15 PROCEDURE — 3062F POS MACROALBUMINURIA REV: CPT | Performed by: PHYSICIAN ASSISTANT

## 2024-04-15 PROCEDURE — G0439 PPPS, SUBSEQ VISIT: HCPCS | Performed by: PHYSICIAN ASSISTANT

## 2024-04-15 PROCEDURE — 1159F MED LIST DOCD IN RCRD: CPT | Performed by: PHYSICIAN ASSISTANT

## 2024-04-15 PROCEDURE — 1158F ADVNC CARE PLAN TLK DOCD: CPT | Performed by: PHYSICIAN ASSISTANT

## 2024-04-15 ASSESSMENT — ACTIVITIES OF DAILY LIVING (ADL)
DRESSING: INDEPENDENT
MANAGING_FINANCES: INDEPENDENT
TAKING_MEDICATION: INDEPENDENT
DOING_HOUSEWORK: INDEPENDENT
GROCERY_SHOPPING: INDEPENDENT
BATHING: INDEPENDENT

## 2024-04-15 ASSESSMENT — PATIENT HEALTH QUESTIONNAIRE - PHQ9
1. LITTLE INTEREST OR PLEASURE IN DOING THINGS: NOT AT ALL
2. FEELING DOWN, DEPRESSED OR HOPELESS: NOT AT ALL
SUM OF ALL RESPONSES TO PHQ9 QUESTIONS 1 AND 2: 0

## 2024-04-15 ASSESSMENT — ENCOUNTER SYMPTOMS
OCCASIONAL FEELINGS OF UNSTEADINESS: 0
DEPRESSION: 0
LOSS OF SENSATION IN FEET: 0

## 2024-04-15 NOTE — ASSESSMENT & PLAN NOTE
- BP today was 160/86 mmHg then came down to 128/78 mmHg on repeat. Seems to have at least some element of white coat HTN.   - She has been checking pressures at home and SBPs often running in 120s   - Congratulated the pt on eliminating soda and salty foods from her diet and adding more exercise! Encouraged continued lifestyle modifications

## 2024-04-15 NOTE — ASSESSMENT & PLAN NOTE
- Current weight: 103 kg (226 lb)  - Weight change since last visit (-) denotes wt loss: -15 lbs   - Weight loss needed to achieve BMI 25: 91.8 Lbs  - Weight loss needed to achieve BMI 30: 64.9 Lbs  - Congratulated the pt on the 15lb weight loss and healthy lifestyle modifications   - Recommend continue with regular exercise and caloric restriction   - She stopped the Mounjaro and doesn't want to restart this right now   - Follow up again in 3 months for weight check

## 2024-04-15 NOTE — ASSESSMENT & PLAN NOTE
- Hopefully improving with improving glycemic control   - Due for updated labs - ordered previously, recommend she get these done

## 2024-04-15 NOTE — ASSESSMENT & PLAN NOTE
- Cardiovascular risk was discussed today   - Pt's 10 year ASCVD risk today is 21.8%    - Lifestyle modifications were recommended, including nutritional choices, exercise, and elimination of habits contributing to risk.    - We agreed on a plan to reduce the current cardiovascular risk.    - Aspirin use/disuse was discussed after reviewing updated guidelines.   - CT cardiac score ordered today

## 2024-04-15 NOTE — ASSESSMENT & PLAN NOTE
- Healthy lifestyle modifications and continued weight loss encouraged   - Pt continues to declined medicine and wants to see what she can do with lifestyle.   - Will continue to monitor with routine labs

## 2024-04-15 NOTE — ASSESSMENT & PLAN NOTE
- Labs: UTD  - Cologuard/ Colonoscopy: UTD   - DEXA: Declined   Vaccines:   - Shingles Vaccine (start at 50): declined   - Tetanus (q10yrs): UTD  - Pneumovax: UTD  Women's health:  - Mammogram: UTD  Lifestyle Modification:  - Discussed DIET -   limit snacks, processed foods, sugary and greasy foods, fast foods. Increase healthy alternatives, whole grains, fruits vegetables.  - Discussed EXERCISE -   Recommended weight training for bone health and 30 minutes of cardiovascular exercise 5-7 days a week.  - Encouraged pt to get yearly eye and dental exams

## 2024-04-15 NOTE — ASSESSMENT & PLAN NOTE
- I offered to discuss advanced directives with Vicenta  today - she  voluntarily choose to proceed with the discussion   - Present for the discussion was: Vicenta and I    - I provided an explanation of living will, healthcare power of , DNR orders. Pt was given handout from [employer] to complete and return to be scanned into her medical record.  - If pt is unable to make their own medical decisions, she wishes for her three children to be consulted as POA

## 2024-04-15 NOTE — ASSESSMENT & PLAN NOTE
- Noncompliant with meds - stopped Mounjaro - ran out of refills and decided to stop taking it   - Farxiga was too expensive   - Advised she complete updated labs to monitor

## 2024-04-15 NOTE — PROGRESS NOTES
Medicare Wellness Exam/Comprehensive Problem Focused Follow Up and Physical Exam  Chief Complaint   Patient presents with    Medicare Annual Wellness Visit Subsequent     Pt here today for  Medicare Annual Wellness Visit. Pt states only took Monjouro for month of Jan and states no refills and states Farxiga was to expensive for never picked up.      HPI:    T2DM   - Quit drinking pop and sugars have improved   - A1c today 7.1% down from 7.9%   - Has been exercising   - Not compliant with her medications - not on any meds     HTN   - Checked at home 130/93 mmHg this morning  - Declines any medicine   - Watches her BP with her watch   - Limits salt - cut out chips       Assessment and Plan:  Problem List Items Addressed This Visit       Chronic gouty arthropathy    Relevant Orders    Disability Placard    Hypertension associated with type 2 diabetes mellitus (Multi)    Overview     - Pt DECLINES MED          Current Assessment & Plan     - BP today was 160/86 mmHg then came down to 128/78 mmHg on repeat. Seems to have at least some element of white coat HTN.   - She has been checking pressures at home and SBPs often running in 120s   - Congratulated the pt on eliminating soda and salty foods from her diet and adding more exercise! Encouraged continued lifestyle modifications          Class 3 severe obesity due to excess calories with serious comorbidity and body mass index (BMI) of 40.0 to 44.9 in adult (Multi)    Current Assessment & Plan     - Current weight: 103 kg (226 lb)  - Weight change since last visit (-) denotes wt loss: -15 lbs   - Weight loss needed to achieve BMI 25: 91.8 Lbs  - Weight loss needed to achieve BMI 30: 64.9 Lbs  - Congratulated the pt on the 15lb weight loss and healthy lifestyle modifications   - Recommend continue with regular exercise and caloric restriction   - She stopped the Mounjaro and doesn't want to restart this right now   - Follow up again in 3 months for weight check           Type 2 diabetes mellitus with hyperglycemia, without long-term current use of insulin (Multi)    Overview     - Most recent A1c was 7.1% (4/15/24) down from 7.9% prior  - Current tx: lifestyle modification, pt DECLINES MEDS   - Historical med: metformin (GI upset), Mounjaro (ran out of it and decided to d/c), Farxiga (too expensive)   - Most recent eGFR was 59 (1/6/23)  - Most recent UACr was 154.6 (1/6/23)  - NOT on aspirin   - NOT on statin (declined)  - NOT on ACE-I (declined)            Relevant Orders    POCT glycosylated hemoglobin (Hb A1C) manually resulted (Completed)    Hyperlipidemia associated with type 2 diabetes mellitus (Multi)    Overview     - Most recent , Hdl 49.0, ratio 4.2, trigs 124 (1/6/23)  - PT DECLINES STATIN         Current Assessment & Plan     - Healthy lifestyle modifications and continued weight loss encouraged   - Pt continues to declined medicine and wants to see what she can do with lifestyle.   - Will continue to monitor with routine labs         Annual physical exam    Overview     - Lives in [place] with her daughter (Cindy) and son in law  - Mamm negative 1/11/24 - next due 1/2025  - Cologuard negative 11/14/23, next due 11/2026         Current Assessment & Plan     - Labs: UTD  - Cologuard/ Colonoscopy: UTD   - DEXA: Declined   Vaccines:   - Shingles Vaccine (start at 50): declined   - Tetanus (q10yrs): UTD  - Pneumovax: UTD  Women's health:  - Mammogram: UTD  Lifestyle Modification:  - Discussed DIET -   limit snacks, processed foods, sugary and greasy foods, fast foods. Increase healthy alternatives, whole grains, fruits vegetables.  - Discussed EXERCISE -   Recommended weight training for bone health and 30 minutes of cardiovascular exercise 5-7 days a week.  - Encouraged pt to get yearly eye and dental exams          CKD stage G2/A2, GFR 60-89 and albumin creatinine ratio  mg/g    Overview     - Most recent UACr was 154.6 (1/6/23)  - Most recent eGFR was 59  (1/6/23)         Current Assessment & Plan     - Hopefully improving with improving glycemic control   - Due for updated labs - ordered previously, recommend she get these done            Type 2 diabetes mellitus with stage 2 chronic kidney disease, without long-term current use of insulin (Multi)    Overview     - Most recent A1c was 7.1% (4/15/24) down from 7.9% prior   - Most recent UACr was 154.6 (1/6/23)  - Most recent eGFR was 59 (1/6/23)         Current Assessment & Plan     - Noncompliant with meds - stopped Mounjaro - ran out of refills and decided to stop taking it   - Farxiga was too expensive   - Advised she complete updated labs to monitor          Cardiac risk counseling    Current Assessment & Plan     - Cardiovascular risk was discussed today   - Pt's 10 year ASCVD risk today is 21.8%    - Lifestyle modifications were recommended, including nutritional choices, exercise, and elimination of habits contributing to risk.    - We agreed on a plan to reduce the current cardiovascular risk.    - Aspirin use/disuse was discussed after reviewing updated guidelines.   - CT cardiac score ordered today           Relevant Orders    CT cardiac scoring wo IV contrast    Advanced care planning/counseling discussion - Primary    Current Assessment & Plan     - I offered to discuss advanced directives with Vicenta  today - she  voluntarily choose to proceed with the discussion   - Present for the discussion was: Jerod    - I provided an explanation of living will, healthcare power of , DNR orders. Pt was given handout from [employer] to complete and return to be scanned into her medical record.  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    Chronic gouty arthropathy    Epidermal  inclusion cyst    Gout    Headache    Internal hemorrhoids    Lumbar radiculopathy    Moniliasis skin    Myopia with presbyopia of both eyes    Posterior vitreous detachment of right eye    Pustular folliculitis    Umbilical hernia    Vitamin D deficiency    Hypertension associated with type 2 diabetes mellitus (Multi)    Renal insufficiency    Class 3 severe obesity due to excess calories with serious comorbidity and body mass index (BMI) of 40.0 to 44.9 in adult (Multi)    Type 2 diabetes mellitus with hyperglycemia, without long-term current use of insulin (Multi)    Hyperlipidemia associated with type 2 diabetes mellitus (Multi)    Annual physical exam    CKD stage G2/A2, GFR 60-89 and albumin creatinine ratio  mg/g    Type 2 diabetes mellitus with stage 2 chronic kidney disease, without long-term current use of insulin (Multi)    Cardiac risk counseling    Advanced care planning/counseling discussion       Comprehensive Medical/Surgical/Social/Family History  No past medical history on file.  Past Surgical History:   Procedure Laterality Date    CATARACT EXTRACTION Bilateral     Left: 5/20/2019 and right 6/3/2019    CHOLECYSTECTOMY      Cholecystectomy Laparoscopic    CYST REMOVAL       Social History     Social History Narrative    Not on file     Tobacco/Alcohol/Opioid use, as well as Illicit Drug Use: none      Allergies and Medications  Patient has no known allergies.  Current Outpatient Medications   Medication Instructions    acetaminophen (TYLENOL ARTHRITIS PAIN) 650 mg, oral, Every 8 hours PRN, Do not crush, chew, or split.    ascorbic acid (VITAMIN C ORAL) 1,000 mg, oral, Daily    cholecalciferol, vitamin D3, (VITAMIN D3 ORAL) 2,000 Units, oral, Daily    GARLIC ORAL 1,000 mg, oral, Daily    ZINC ACETATE ORAL 50 mg, Mouth/Throat, Daily     Medications and Supplements  prescribed by me and other practitioners or clinical pharmacist (such as prescriptions, OTC's, herbal therapies and supplements)  were reviewed and documented in the medical record.      Activities of Daily Living  In your present state of health, do you have any difficulty performing the following activities?:   Preparing food and eating?: No  Bathing yourself: No  Getting dressed: No  Using the toilet:No  Moving around from place to place: No  In the past year have you fallen or had a near fall?:No  Able to manage finances independently: Yes  Able to perform grocery shopping: Yes  Able to manage medications independently: Yes  Able to do housework independently: Yes  Patient self-assessment of health status? Fair    Patient Care Team:  Shasta Greer PA-C as PCP - General (Family Medicine)     Dietary issues discussed: Yes  Hearing difficulties: No  Safe in current home environment: Yes  Visual Acuity assessed: No  Cognitive Impairment No    Depression Screening  Depression screening (15m) completed using the PHQ-2 questions with results documented in the chart/encounter  (Rooming Screening section for documentation, or note for additional information)    Cardiac Risk Assessment  Cardiovascular risk was discussed and, if needed, lifestyle modifications recommended, including nutritional choices, exercise, and elimination of habits contributing to risk.   We agreed on a plan to reduce the current cardiovascular risk based on above discussion as needed. We spent approximately 15 minutes on this discussion today.     Aspirin use/disuse was discussed and documented in the Problem List of the medical record (under Cardiac Risk Counseling) after reviewing the updated guidelines below:  Consider low dose Aspirin ( mg) use if the benefit for cardiovascular disease prevention outweighs risk for bleeding complications.   In general, low dose ASA should be considered:  In patients WITHOUT prior MI/stroke/PAD (primary prevention):   a. Age <60: Use if 10-year cardiovascular disease risk >20%, with discussion of risks and benefits with  "patient  b. Age 60-<70: Use if 10-year cardiovascular disease risk >20% and low bleeding (e.g., gastrointestinal) risk, with discussion of risks and benefits with patient  c. Age >=70: Do not use    In patients WITH prior MI/stroke/PAD (secondary prevention):   Generally use unless extremely high bleeding (e.g., gastrointenstinal) risk, with discussion of risks and benefits with patient    Advance Directives Discussion  Advanced Care Planning (including a Living Will, Healthcare POA, as well as specific end of life choices and/or directives), was discussed with the patient and/or surrogate, voluntarily, and details of that discussion documented in the Problem List (under Advanced Directives Discussion) of the medical record.    (~16 min spent discussing above)     ROS otherwise negative aside from what was mentioned above in HPI.    Vitals  /78   Pulse 102   Temp 36.6 °C (97.8 °F)   Resp 18   Ht 1.562 m (5' 1.5\")   Wt 103 kg (226 lb)   SpO2 96%   BMI 42.01 kg/m²   Body mass index is 42.01 kg/m².  Physical Exam  General:  Obese.. Alert, oriented, good hygiene, not disheveled. Well nourished.    No obvious skin rashes noted.   Normal gait    Mood is pleasant, not tearful, no signs of emotional distress.  Not appearing intoxicated or altered.   No voiced delusions or paranoia,    Normal, appropriate behavior.    Respiratory: Equal breath sounds  No wheezes, rales, rhonchi  No respiratory distress.    Heart: Regular rate and rhythm, no  murmurs        During the course of the visit the patient was educated and counseled about age appropriate screening and preventive services. Completed preventive screenings were documented in the chart and orders were placed for outstanding screenings/procedures as documented in the Assessment and Plan.    "

## 2024-04-27 ENCOUNTER — LAB (OUTPATIENT)
Dept: LAB | Facility: LAB | Age: 74
End: 2024-04-27
Payer: MEDICARE

## 2024-04-27 DIAGNOSIS — E11.22 TYPE 2 DIABETES MELLITUS WITH STAGE 2 CHRONIC KIDNEY DISEASE, WITHOUT LONG-TERM CURRENT USE OF INSULIN (MULTI): ICD-10-CM

## 2024-04-27 DIAGNOSIS — N18.2 TYPE 2 DIABETES MELLITUS WITH STAGE 2 CHRONIC KIDNEY DISEASE, WITHOUT LONG-TERM CURRENT USE OF INSULIN (MULTI): ICD-10-CM

## 2024-04-27 DIAGNOSIS — E11.65 TYPE 2 DIABETES MELLITUS WITH HYPERGLYCEMIA, WITHOUT LONG-TERM CURRENT USE OF INSULIN (MULTI): ICD-10-CM

## 2024-04-27 LAB
ANION GAP SERPL CALC-SCNC: 15 MMOL/L (ref 10–20)
BUN SERPL-MCNC: 16 MG/DL (ref 6–23)
CALCIUM SERPL-MCNC: 9.2 MG/DL (ref 8.6–10.3)
CHLORIDE SERPL-SCNC: 103 MMOL/L (ref 98–107)
CO2 SERPL-SCNC: 27 MMOL/L (ref 21–32)
CREAT SERPL-MCNC: 0.97 MG/DL (ref 0.5–1.05)
CREAT UR-MCNC: 159.3 MG/DL (ref 20–320)
EGFRCR SERPLBLD CKD-EPI 2021: 62 ML/MIN/1.73M*2
GLUCOSE SERPL-MCNC: 126 MG/DL (ref 74–99)
MICROALBUMIN UR-MCNC: 305.1 MG/L
MICROALBUMIN/CREAT UR: 191.5 UG/MG CREAT
POTASSIUM SERPL-SCNC: 4.5 MMOL/L (ref 3.5–5.3)
SODIUM SERPL-SCNC: 140 MMOL/L (ref 136–145)

## 2024-04-27 PROCEDURE — 36415 COLL VENOUS BLD VENIPUNCTURE: CPT

## 2024-04-27 PROCEDURE — 82570 ASSAY OF URINE CREATININE: CPT

## 2024-04-27 PROCEDURE — 82043 UR ALBUMIN QUANTITATIVE: CPT

## 2024-04-27 PROCEDURE — 80048 BASIC METABOLIC PNL TOTAL CA: CPT

## 2024-07-05 ENCOUNTER — HOSPITAL ENCOUNTER (OUTPATIENT)
Dept: RADIOLOGY | Facility: CLINIC | Age: 74
Discharge: HOME | End: 2024-07-05
Payer: MEDICARE

## 2024-07-05 DIAGNOSIS — Z71.89 CARDIAC RISK COUNSELING: ICD-10-CM

## 2024-07-05 PROCEDURE — 75571 CT HRT W/O DYE W/CA TEST: CPT

## 2024-07-15 ENCOUNTER — APPOINTMENT (OUTPATIENT)
Dept: PRIMARY CARE | Facility: CLINIC | Age: 74
End: 2024-07-15
Payer: MEDICARE

## 2024-07-15 VITALS
OXYGEN SATURATION: 97 % | RESPIRATION RATE: 16 BRPM | WEIGHT: 228 LBS | HEIGHT: 62 IN | SYSTOLIC BLOOD PRESSURE: 125 MMHG | TEMPERATURE: 97.3 F | BODY MASS INDEX: 41.96 KG/M2 | HEART RATE: 69 BPM | DIASTOLIC BLOOD PRESSURE: 83 MMHG

## 2024-07-15 DIAGNOSIS — E11.22 TYPE 2 DIABETES MELLITUS WITH STAGE 2 CHRONIC KIDNEY DISEASE, WITHOUT LONG-TERM CURRENT USE OF INSULIN (MULTI): ICD-10-CM

## 2024-07-15 DIAGNOSIS — I15.2 HYPERTENSION ASSOCIATED WITH TYPE 2 DIABETES MELLITUS (MULTI): ICD-10-CM

## 2024-07-15 DIAGNOSIS — N18.2 CKD STAGE G2/A2, GFR 60-89 AND ALBUMIN CREATININE RATIO 30-299 MG/G: Primary | ICD-10-CM

## 2024-07-15 DIAGNOSIS — N18.2 TYPE 2 DIABETES MELLITUS WITH STAGE 2 CHRONIC KIDNEY DISEASE, WITHOUT LONG-TERM CURRENT USE OF INSULIN (MULTI): ICD-10-CM

## 2024-07-15 DIAGNOSIS — E11.59 HYPERTENSION ASSOCIATED WITH TYPE 2 DIABETES MELLITUS (MULTI): ICD-10-CM

## 2024-07-15 DIAGNOSIS — E11.65 TYPE 2 DIABETES MELLITUS WITH HYPERGLYCEMIA, WITHOUT LONG-TERM CURRENT USE OF INSULIN (MULTI): ICD-10-CM

## 2024-07-15 LAB — POC HEMOGLOBIN A1C: 6.5 % (ref 4.2–6.5)

## 2024-07-15 PROCEDURE — 3062F POS MACROALBUMINURIA REV: CPT | Performed by: PHYSICIAN ASSISTANT

## 2024-07-15 PROCEDURE — 3050F LDL-C >= 130 MG/DL: CPT | Performed by: PHYSICIAN ASSISTANT

## 2024-07-15 PROCEDURE — 3008F BODY MASS INDEX DOCD: CPT | Performed by: PHYSICIAN ASSISTANT

## 2024-07-15 PROCEDURE — 3051F HG A1C>EQUAL 7.0%<8.0%: CPT | Performed by: PHYSICIAN ASSISTANT

## 2024-07-15 PROCEDURE — 83036 HEMOGLOBIN GLYCOSYLATED A1C: CPT | Performed by: PHYSICIAN ASSISTANT

## 2024-07-15 PROCEDURE — 1123F ACP DISCUSS/DSCN MKR DOCD: CPT | Performed by: PHYSICIAN ASSISTANT

## 2024-07-15 PROCEDURE — 3074F SYST BP LT 130 MM HG: CPT | Performed by: PHYSICIAN ASSISTANT

## 2024-07-15 PROCEDURE — 3079F DIAST BP 80-89 MM HG: CPT | Performed by: PHYSICIAN ASSISTANT

## 2024-07-15 PROCEDURE — 1159F MED LIST DOCD IN RCRD: CPT | Performed by: PHYSICIAN ASSISTANT

## 2024-07-15 PROCEDURE — 1160F RVW MEDS BY RX/DR IN RCRD: CPT | Performed by: PHYSICIAN ASSISTANT

## 2024-07-15 PROCEDURE — 1036F TOBACCO NON-USER: CPT | Performed by: PHYSICIAN ASSISTANT

## 2024-07-15 PROCEDURE — 99213 OFFICE O/P EST LOW 20 MIN: CPT | Performed by: PHYSICIAN ASSISTANT

## 2024-07-15 ASSESSMENT — ENCOUNTER SYMPTOMS
SHORTNESS OF BREATH: 0
COUGH: 0
PALPITATIONS: 0

## 2024-07-15 NOTE — ASSESSMENT & PLAN NOTE
"- Currently doing well restricting her diet from carbs and sweet foods  - Encouraged her to avoid high sodium in her diet and to limit her \"cheat days\"  - Exercising when she can throughout the day by walking  - Currently declining medications and choosing to continue lifestyle modifications  "

## 2024-07-15 NOTE — PROGRESS NOTES
Subjective   Patient ID: Vicenta Rondon is a 73 y.o. female who presents for No chief complaint on file..    HPI     Follow up for T2DM   - Last visit A1c was trending down with lifestyle modifications. Decided not to take medicine anymore and quit drinking soda   - A1c today is ***   - Current diet:   - Activity level:     Follow up for HTN:  - Last visit was borderline high and pt declined medicine, opting for lifestyle modification   - Caffeine intake:   - NSAID use:   - Sodium intake:  - Activity level:   - Complications?: Pt denies orthostatic hypotension, chest pains, palpitations, dizziness, SOB, lower extremity edema     Follow up on CKD   - Most recent eGFR was 62 and UACr was 191.5 (stage G2A2)   - Pt declines medicine for sugars as well as for CKD. She prefers to treat with lifestyle modifications   -     Review of Systems   Respiratory:  Negative for shortness of breath.    Cardiovascular:  Negative for chest pain, palpitations and leg swelling.       Objective   There were no vitals taken for this visit.    Physical Exam  Constitutional:       Appearance: She is obese.   Cardiovascular:      Rate and Rhythm: Normal rate and regular rhythm.      Pulses: Normal pulses.      Heart sounds: Normal heart sounds. No murmur heard.  Pulmonary:      Effort: Pulmonary effort is normal.      Breath sounds: Normal breath sounds.   Neurological:      Mental Status: She is alert.   Psychiatric:         Mood and Affect: Mood and affect normal.         Assessment/Plan     Problem List Items Addressed This Visit       Hypertension associated with type 2 diabetes mellitus (Multi)    Overview     - Pt DECLINES MED          Type 2 diabetes mellitus with hyperglycemia, without long-term current use of insulin (Multi)    Overview     - Most recent A1c was 7.1% (4/15/24) down from 7.9% prior  - Current tx: lifestyle modification, pt DECLINES MEDS   - Historical med: metformin (GI upset), Mounjaro (ran out of it and decided to  d/c), Farxiga (too expensive)   - Most recent eGFR was 62 (4/27/24) up from 59 prior   - Most recent UACr was 191.5 (4/27/24) up from 154.6 prior   - NOT on aspirin   - NOT on statin (declined)  - NOT on ACE-I (declined)            CKD stage G2/A2, GFR 60-89 and albumin creatinine ratio  mg/g - Primary    Overview     - Most recent UACr was 191.5 (4/27/24) up from 154.6 prior - kidney damages continues to progress - pt continues to DECLINE MEDICINE   - Most recent eGFR was 62 (4/27/24) up from 59 prior          Type 2 diabetes mellitus with stage 2 chronic kidney disease, without long-term current use of insulin (Multi)    Overview     - Most recent A1c was 7.1% (4/15/24) down from 7.9% prior  - Current tx: lifestyle modification, pt DECLINES MEDS   - Historical med: metformin (GI upset), Mounjaro (ran out of it and decided to d/c), Farxiga (too expensive)   - Most recent eGFR was 62 (4/27/24) up from 59 prior   - Most recent UACr was 191.5 (4/27/24) up from 154.6 prior   - NOT on aspirin   - NOT on statin (declined)  - NOT on ACE-I (declined)

## 2024-07-15 NOTE — PROGRESS NOTES
Subjective   Patient ID: Vicenta Rondon is a 73 y.o. female who presents for 3 month follow up and Cough (Pt daughter states she has been coughing for 3 weeks she states she is not coughing for over a cough. Daughter thinks she might have bronchitis.  ).    Cough  Pertinent negatives include no chest pain or shortness of breath.        Follow up for T2DM   - Last visit A1c was trending down with lifestyle modifications. Decided not to take medicine anymore and quit drinking soda   - A1c today is 6.5% (7/15) down from 7.1 last time  - Current diet: Sometimes cheat (fluctuating in weight). Stopped drinking pop, controlling what she's eating (limiting carbs and sugary sweets).  - Activity level: Difficult with her pinched nerve. Trying to walk as much as she can (around the house, to her mom's, at the store).     Follow up for HTN:  - Last visit was borderline high and pt declined medicine, opting for lifestyle modification   - Blood pressure today 125/82  - Caffeine intake: Drinking decaf and drinking water (few sips of soda now and again)  - NSAID use: Only using Tylenol for when she needs it  - Sodium intake: Lowering her Sodium intake as much as she can on packaging.   - Activity level: Difficult with her pinched nerve. Trying to walk as much as she can (around the house, to her mom's, at the store).   - Complications: Pt denies orthostatic hypotension, chest pains, palpitations, dizziness, SOB, lower extremity edema    Follow up on CKD   - Most recent eGFR was 62 and UACr was 191.5 (stage G2A2)   - Pt declines medicine for sugars as well as for CKD. She prefers to treat with lifestyle modifications       Review of Systems   Respiratory:  Negative for cough and shortness of breath.    Cardiovascular:  Negative for chest pain, palpitations and leg swelling.       Objective   /83 (BP Location: Right arm, Patient Position: Sitting, BP Cuff Size: Large adult)   Pulse 69   Temp 36.3 °C (97.3 °F) (Temporal)    "Resp 16   Ht 1.562 m (5' 1.5\")   Wt 103 kg (228 lb)   SpO2 97%   BMI 42.38 kg/m²     Physical Exam  Constitutional:       Appearance: She is obese.   HENT:      Nose: No rhinorrhea.   Cardiovascular:      Rate and Rhythm: Normal rate and regular rhythm.      Pulses: Normal pulses.      Heart sounds: Normal heart sounds. No murmur heard.  Pulmonary:      Effort: Pulmonary effort is normal. No respiratory distress.      Breath sounds: Normal breath sounds. No stridor. No wheezing or rales.   Neurological:      Mental Status: She is alert.   Psychiatric:         Mood and Affect: Mood and affect normal.         Assessment/Plan     Problem List Items Addressed This Visit       Hypertension associated with type 2 diabetes mellitus (Multi)    Overview     - Pt DECLINES MED          Current Assessment & Plan     - Currently doing well restricting her diet from carbs and sweet foods  - Encouraged her to avoid high sodium in her diet and to limit her \"cheat days\"  - Exercising when she can throughout the day by walking  - Currently declining medications and choosing to continue lifestyle modifications         Relevant Orders    Albumin-Creatinine Ratio, Urine Random    CBC    Comprehensive Metabolic Panel    Hemoglobin A1C    Lipid Panel    Type 2 diabetes mellitus with hyperglycemia, without long-term current use of insulin (Multi)    Overview     - Most recent A1c was 7.1% (4/15/24) down from 7.9% prior  - Current tx: lifestyle modification, pt DECLINES MEDS   - Historical med: metformin (GI upset), Mounjaro (ran out of it and decided to d/c), Farxiga (too expensive)   - Most recent eGFR was 62 (4/27/24) up from 59 prior   - Most recent UACr was 191.5 (4/27/24) up from 154.6 prior   - NOT on aspirin   - NOT on statin (declined)  - NOT on ACE-I (declined)            Relevant Orders    POCT glycosylated hemoglobin (Hb A1C) manually resulted (Completed)    Albumin-Creatinine Ratio, Urine Random    CBC    Comprehensive " Metabolic Panel    Hemoglobin A1C    Lipid Panel    CKD stage G2/A2, GFR 60-89 and albumin creatinine ratio  mg/g - Primary    Overview     - Most recent UACr was 191.5 (4/27/24) up from 154.6 prior - kidney damages continues to progress - pt continues to DECLINE MEDICINE   - Most recent eGFR was 62 (4/27/24) up from 59 prior          Relevant Orders    Albumin-Creatinine Ratio, Urine Random    CBC    Comprehensive Metabolic Panel    Hemoglobin A1C    Lipid Panel    Type 2 diabetes mellitus with stage 2 chronic kidney disease, without long-term current use of insulin (Multi)    Overview     - Most recent A1c was 7.1% (4/15/24) down from 7.9% prior  - Current tx: lifestyle modification, pt DECLINES MEDS   - Historical med: metformin (GI upset), Mounjaro (ran out of it and decided to d/c), Farxiga (too expensive)   - Most recent eGFR was 62 (4/27/24) up from 59 prior   - Most recent UACr was 191.5 (4/27/24) up from 154.6 prior   - NOT on aspirin   - NOT on statin (declined)  - NOT on ACE-I (declined)          Current Assessment & Plan     - Currently doing well restricting her diet from carbs and sweet foods  - Exercising when she can throughout the day by walking  - Currently declining medications and choosing to continue lifestyle modifications         Relevant Orders    Albumin-Creatinine Ratio, Urine Random    CBC    Comprehensive Metabolic Panel    Hemoglobin A1C    Lipid Panel       - SHYANN Vo-S2    I was present with the PA student who participated in the documentation of this note. I have personally seen and re-examined the patient and performed the medical decision-making components (assessment and plan of care). I have reviewed the PA student documentation and verified the findings in the note as written with additions or exceptions as stated in the body of this note.    SHAYNN Dias-C

## 2024-07-15 NOTE — ASSESSMENT & PLAN NOTE
- Currently doing well restricting her diet from carbs and sweet foods  - Exercising when she can throughout the day by walking  - Currently declining medications and choosing to continue lifestyle modifications

## 2025-04-15 ENCOUNTER — APPOINTMENT (OUTPATIENT)
Dept: PRIMARY CARE | Facility: CLINIC | Age: 75
End: 2025-04-15
Payer: MEDICARE

## 2025-04-17 ENCOUNTER — APPOINTMENT (OUTPATIENT)
Dept: PRIMARY CARE | Facility: CLINIC | Age: 75
End: 2025-04-17
Payer: MEDICARE

## 2025-05-22 ENCOUNTER — APPOINTMENT (OUTPATIENT)
Dept: PRIMARY CARE | Facility: CLINIC | Age: 75
End: 2025-05-22
Payer: MEDICARE

## 2025-05-22 VITALS
TEMPERATURE: 97.3 F | WEIGHT: 238 LBS | SYSTOLIC BLOOD PRESSURE: 148 MMHG | HEART RATE: 92 BPM | OXYGEN SATURATION: 98 % | HEIGHT: 62 IN | RESPIRATION RATE: 18 BRPM | DIASTOLIC BLOOD PRESSURE: 64 MMHG | BODY MASS INDEX: 43.79 KG/M2

## 2025-05-22 DIAGNOSIS — E11.69 HYPERLIPIDEMIA ASSOCIATED WITH TYPE 2 DIABETES MELLITUS: ICD-10-CM

## 2025-05-22 DIAGNOSIS — I15.2 HYPERTENSION ASSOCIATED WITH TYPE 2 DIABETES MELLITUS: ICD-10-CM

## 2025-05-22 DIAGNOSIS — E11.22 TYPE 2 DIABETES MELLITUS WITH STAGE 2 CHRONIC KIDNEY DISEASE, WITHOUT LONG-TERM CURRENT USE OF INSULIN (MULTI): ICD-10-CM

## 2025-05-22 DIAGNOSIS — E11.65 TYPE 2 DIABETES MELLITUS WITH HYPERGLYCEMIA, WITHOUT LONG-TERM CURRENT USE OF INSULIN: ICD-10-CM

## 2025-05-22 DIAGNOSIS — Z23 NEED FOR SHINGLES VACCINE: ICD-10-CM

## 2025-05-22 DIAGNOSIS — R39.9 UTI SYMPTOMS: ICD-10-CM

## 2025-05-22 DIAGNOSIS — Z12.31 ENCOUNTER FOR SCREENING MAMMOGRAM FOR MALIGNANT NEOPLASM OF BREAST: ICD-10-CM

## 2025-05-22 DIAGNOSIS — E78.5 HYPERLIPIDEMIA ASSOCIATED WITH TYPE 2 DIABETES MELLITUS: ICD-10-CM

## 2025-05-22 DIAGNOSIS — N18.2 TYPE 2 DIABETES MELLITUS WITH STAGE 2 CHRONIC KIDNEY DISEASE, WITHOUT LONG-TERM CURRENT USE OF INSULIN (MULTI): ICD-10-CM

## 2025-05-22 DIAGNOSIS — E11.59 HYPERTENSION ASSOCIATED WITH TYPE 2 DIABETES MELLITUS: ICD-10-CM

## 2025-05-22 DIAGNOSIS — Z00.00 ANNUAL PHYSICAL EXAM: Primary | ICD-10-CM

## 2025-05-22 DIAGNOSIS — N18.2 CKD STAGE G2/A2, GFR 60-89 AND ALBUMIN CREATININE RATIO 30-299 MG/G: ICD-10-CM

## 2025-05-22 DIAGNOSIS — E66.813 CLASS 3 SEVERE OBESITY DUE TO EXCESS CALORIES WITH SERIOUS COMORBIDITY AND BODY MASS INDEX (BMI) OF 40.0 TO 44.9 IN ADULT: ICD-10-CM

## 2025-05-22 PROBLEM — L72.0 EPIDERMAL INCLUSION CYST: Status: RESOLVED | Noted: 2023-11-01 | Resolved: 2025-05-22

## 2025-05-22 PROBLEM — R51.9 HEADACHE: Status: RESOLVED | Noted: 2017-07-21 | Resolved: 2025-05-22

## 2025-05-22 PROBLEM — Z71.89 CARDIAC RISK COUNSELING: Status: RESOLVED | Noted: 2024-04-15 | Resolved: 2025-05-22

## 2025-05-22 PROBLEM — Z71.89 ADVANCED CARE PLANNING/COUNSELING DISCUSSION: Status: RESOLVED | Noted: 2024-04-15 | Resolved: 2025-05-22

## 2025-05-22 LAB
ALBUMIN SERPL-MCNC: 3.8 G/DL (ref 3.6–5.1)
ALBUMIN/CREAT UR: 144 MG/G CREAT
ALP SERPL-CCNC: 78 U/L (ref 37–153)
ALT SERPL-CCNC: 15 U/L (ref 6–29)
ANION GAP SERPL CALCULATED.4IONS-SCNC: 10 MMOL/L (CALC) (ref 7–17)
AST SERPL-CCNC: 11 U/L (ref 10–35)
BILIRUB SERPL-MCNC: 0.4 MG/DL (ref 0.2–1.2)
BUN SERPL-MCNC: 14 MG/DL (ref 7–25)
CALCIUM SERPL-MCNC: 8.5 MG/DL (ref 8.6–10.4)
CHLORIDE SERPL-SCNC: 106 MMOL/L (ref 98–110)
CHOLEST SERPL-MCNC: 205 MG/DL
CHOLEST/HDLC SERPL: 4.2 (CALC)
CO2 SERPL-SCNC: 24 MMOL/L (ref 20–32)
CREAT SERPL-MCNC: 0.93 MG/DL (ref 0.6–1)
CREAT UR-MCNC: 146 MG/DL (ref 20–275)
EGFRCR SERPLBLD CKD-EPI 2021: 65 ML/MIN/1.73M2
ERYTHROCYTE [DISTWIDTH] IN BLOOD BY AUTOMATED COUNT: 13.6 % (ref 11–15)
EST. AVERAGE GLUCOSE BLD GHB EST-MCNC: 183 MG/DL
EST. AVERAGE GLUCOSE BLD GHB EST-SCNC: 10.1 MMOL/L
GLUCOSE SERPL-MCNC: 149 MG/DL (ref 65–99)
HBA1C MFR BLD: 8 %
HCT VFR BLD AUTO: 40.9 % (ref 35–45)
HDLC SERPL-MCNC: 49 MG/DL
HGB BLD-MCNC: 13.1 G/DL (ref 11.7–15.5)
LDLC SERPL CALC-MCNC: 133 MG/DL (CALC)
MCH RBC QN AUTO: 29.4 PG (ref 27–33)
MCHC RBC AUTO-ENTMCNC: 32 G/DL (ref 32–36)
MCV RBC AUTO: 91.9 FL (ref 80–100)
MICROALBUMIN UR-MCNC: 21 MG/DL
NONHDLC SERPL-MCNC: 156 MG/DL (CALC)
PLATELET # BLD AUTO: 365 THOUSAND/UL (ref 140–400)
PMV BLD REES-ECKER: 11 FL (ref 7.5–12.5)
POTASSIUM SERPL-SCNC: 4.3 MMOL/L (ref 3.5–5.3)
PROT SERPL-MCNC: 6.7 G/DL (ref 6.1–8.1)
RBC # BLD AUTO: 4.45 MILLION/UL (ref 3.8–5.1)
SODIUM SERPL-SCNC: 140 MMOL/L (ref 135–146)
TRIGL SERPL-MCNC: 120 MG/DL
WBC # BLD AUTO: 7.4 THOUSAND/UL (ref 3.8–10.8)

## 2025-05-22 PROCEDURE — G0439 PPPS, SUBSEQ VISIT: HCPCS | Performed by: PHYSICIAN ASSISTANT

## 2025-05-22 PROCEDURE — 1160F RVW MEDS BY RX/DR IN RCRD: CPT | Performed by: PHYSICIAN ASSISTANT

## 2025-05-22 PROCEDURE — 1159F MED LIST DOCD IN RCRD: CPT | Performed by: PHYSICIAN ASSISTANT

## 2025-05-22 PROCEDURE — 1036F TOBACCO NON-USER: CPT | Performed by: PHYSICIAN ASSISTANT

## 2025-05-22 PROCEDURE — 1123F ACP DISCUSS/DSCN MKR DOCD: CPT | Performed by: PHYSICIAN ASSISTANT

## 2025-05-22 PROCEDURE — 3008F BODY MASS INDEX DOCD: CPT | Performed by: PHYSICIAN ASSISTANT

## 2025-05-22 PROCEDURE — 3078F DIAST BP <80 MM HG: CPT | Performed by: PHYSICIAN ASSISTANT

## 2025-05-22 PROCEDURE — 3077F SYST BP >= 140 MM HG: CPT | Performed by: PHYSICIAN ASSISTANT

## 2025-05-22 PROCEDURE — 1170F FXNL STATUS ASSESSED: CPT | Performed by: PHYSICIAN ASSISTANT

## 2025-05-22 ASSESSMENT — ACTIVITIES OF DAILY LIVING (ADL)
MANAGING_FINANCES: INDEPENDENT
DRESSING: INDEPENDENT
GROCERY_SHOPPING: INDEPENDENT
BATHING: INDEPENDENT
TAKING_MEDICATION: INDEPENDENT
DOING_HOUSEWORK: INDEPENDENT

## 2025-05-22 ASSESSMENT — PATIENT HEALTH QUESTIONNAIRE - PHQ9
SUM OF ALL RESPONSES TO PHQ9 QUESTIONS 1 AND 2: 0
2. FEELING DOWN, DEPRESSED OR HOPELESS: NOT AT ALL
1. LITTLE INTEREST OR PLEASURE IN DOING THINGS: NOT AT ALL

## 2025-05-22 ASSESSMENT — ENCOUNTER SYMPTOMS
OCCASIONAL FEELINGS OF UNSTEADINESS: 0
DEPRESSION: 0
LOSS OF SENSATION IN FEET: 0

## 2025-05-22 NOTE — ASSESSMENT & PLAN NOTE
- Sugars are trending up   - Discussed the risks of poorly controlled T2DM with the pt including atherosclerotic cardiovascular disease (heart attack, stroke, kidney failure, retinopathy, neuropathy, etc)   - Pt expressed understanding and acceptance of risks and chooses to decline medicine she prefers to attempt lifestyle modifications

## 2025-05-22 NOTE — ASSESSMENT & PLAN NOTE
- Educated the pt about risks of progression of her CKD including kidney failure   - Pt expressed understanding and acceptance of risks and chooses to decline medicine she prefers to attempt lifestyle modifications

## 2025-05-22 NOTE — ASSESSMENT & PLAN NOTE
- BP today is still high 150/88 mmHg and then 148/64 mmHg on repeat   - Discussed risks with the pt including MI, CVA, CKD, PAD, and other micro- and macrovascular changes   - Pt expressed understanding and acceptance of risks and chooses to decline medicine she prefers to attempt lifestyle modifications

## 2025-05-22 NOTE — ASSESSMENT & PLAN NOTE
- Current weight: 108 kg (238 lb)  - Weight change since last visit (-) denotes wt loss 10 lbs   - Weight loss needed to achieve BMI 25: 103.8 Lbs  - Weight loss needed to achieve BMI 30: 76.9 Lbs  - I discussed the health benefits of lower body weight and the risks associated with obesity.   - Discussed DIET - encouraged whole food diet with lots of vegetables, legumes, whole grains, fruits, lean meats, avoid processed foods, high saturated fat/greasy foods, sugary foods and drinks, fast foods/dining out.   - Discussed EXERCISE -   Recommended exercise 3-7 days a week - weight training for bone health and cardiovascular exercise.

## 2025-05-22 NOTE — ASSESSMENT & PLAN NOTE
PREVENTIVE CARE SCREENING:  - Labs:  utd   - Cologuard/ Colonoscopy: utd   Vaccines:   - Shingles Vaccine: due, ordered to pharmacy  - Pneumonia vax: declined prevnar   - Tetanus (q10yrs): utd   Women's health:  - Mammogram: utd   - DEXA scan: due, declined   Lifestyle Modification:  - Discussed DIET - encouraged whole food diet with lots of vegetables, legumes, whole grains, fruits, lean meats, avoid processed foods, high saturated fat/greasy foods, sugary foods and drinks, fast foods/dining out.   - Discussed EXERCISE -   Recommended exercise 3-7 days a week - weight training for bone health and cardiovascular exercise.   - Avoid or quit cigarette smoking   - Limit or abstain from alcohol consumption   - Encouraged pt to get yearly eye and dental exams

## 2025-05-22 NOTE — PROGRESS NOTES
Medicare Wellness Exam/Comprehensive Problem Focused Follow Up and Physical Exam  Chief Complaint   Patient presents with    Medicare Annual Wellness Visit Subsequent     Pt here today for a Medicare Annual Wellness Visit and Mamm due-pending and discuss lab work she had done yesterday.     Thinks left ear has water in it.      HPI:    Preventive:   - Labs: utd  - Colon CA: utd   - Mamm: due   - DEXA: declined   - Shingrix: due, ordered to pharmacy   - Pneumovax/ Prevnar: declined Prevnar   - Td: UTD   - Diet: bad lately, too much bread, drinks juice   - Exercise: not much, just walking around stores and taking her mom out   - Tobacco: no  - Illicit drugs: no  - Alcohol: no  - Mood: good   - Hearing concerns: no   - Dentist visits: due   - Eye exams: utd     Left ear always feels like there's water in it   Hasn't used Flonase spray      Assessment and Plan:  Problem List Items Addressed This Visit       Hypertension associated with type 2 diabetes mellitus    Overview   - Pt DECLINES MED          Current Assessment & Plan   - BP today is still high 150/88 mmHg and then 148/64 mmHg on repeat   - Discussed risks with the pt including MI, CVA, CKD, PAD, and other micro- and macrovascular changes   - Pt expressed understanding and acceptance of risks and chooses to decline medicine she prefers to attempt lifestyle modifications          Class 3 severe obesity due to excess calories with serious comorbidity and body mass index (BMI) of 40.0 to 44.9 in adult    Overview   - 5/22/25 - BMI = 44.24 kg/m2, wt = 238 lbs.          Current Assessment & Plan   - Current weight: 108 kg (238 lb)  - Weight change since last visit (-) denotes wt loss 10 lbs   - Weight loss needed to achieve BMI 25: 103.8 Lbs  - Weight loss needed to achieve BMI 30: 76.9 Lbs  - I discussed the health benefits of lower body weight and the risks associated with obesity.   - Discussed DIET - encouraged whole food diet with lots of vegetables, legumes,  whole grains, fruits, lean meats, avoid processed foods, high saturated fat/greasy foods, sugary foods and drinks, fast foods/dining out.   - Discussed EXERCISE -   Recommended exercise 3-7 days a week - weight training for bone health and cardiovascular exercise.          Type 2 diabetes mellitus with hyperglycemia, without long-term current use of insulin    Overview   - Most recent A1c was 8.0% (5/21/25) up from 7.1% (4/15/24) prior  - Current tx: lifestyle modification, pt DECLINES MEDS   - Historical med: metformin (GI upset), Mounjaro (ran out of it and decided to d/c), Farxiga (too expensive)   - Most recent eGFR was 65 (5/21/25)  - Most recent UACr was 191.5 (4/27/24) up from 154.6 prior   - NOT on aspirin   - NOT on statin (declined)  - NOT on ACE-I (declined)            Current Assessment & Plan   - Sugars are trending up   - Discussed the risks of poorly controlled T2DM with the pt including atherosclerotic cardiovascular disease (heart attack, stroke, kidney failure, retinopathy, neuropathy, etc)   - Pt expressed understanding and acceptance of risks and chooses to decline medicine she prefers to attempt lifestyle modifications          Hyperlipidemia associated with type 2 diabetes mellitus    Overview   - Most recent labs: total chol 205, , HDL 49.0, trigs 120, non-HDL chol (5/21/25)  - PT DECLINES STATIN         Current Assessment & Plan   - I warned pt about the risks of HLD including atherosclerotic cardiovascular disease risk    - Pt expressed understanding and acceptance of risks and chooses to decline medicine she prefers to attempt lifestyle modifications - diet change, exercise, weight loss         Annual physical exam - Primary    Overview   - Lives in [place] with her daughter (Cindy) and son in law  - Cologuard negative 11/14/23, next due 11/2026  - Tdap 11/17/15 - next due 11/2025  - Pneumovax 2015         Current Assessment & Plan   PREVENTIVE CARE SCREENING:  - Labs:  utd   -  Cologuard/ Colonoscopy: utd   Vaccines:   - Shingles Vaccine: due, ordered to pharmacy  - Pneumonia vax: declined prevnar   - Tetanus (q10yrs): utd   Women's health:  - Mammogram: utd   - DEXA scan: due, declined   Lifestyle Modification:  - Discussed DIET - encouraged whole food diet with lots of vegetables, legumes, whole grains, fruits, lean meats, avoid processed foods, high saturated fat/greasy foods, sugary foods and drinks, fast foods/dining out.   - Discussed EXERCISE -   Recommended exercise 3-7 days a week - weight training for bone health and cardiovascular exercise.   - Avoid or quit cigarette smoking   - Limit or abstain from alcohol consumption   - Encouraged pt to get yearly eye and dental exams          Relevant Orders    Follow Up In [facility] - PCP    CKD stage G2/A2, GFR 60-89 and albumin creatinine ratio  mg/g    Overview   - Most recent UACr was 144 (5/21/25) - pt continues to DECLINE MEDICINE   - Most recent eGFR was 62 (5/21/25) up from 59 prior          Current Assessment & Plan   - Educated the pt about risks of progression of her CKD including kidney failure   - Pt expressed understanding and acceptance of risks and chooses to decline medicine she prefers to attempt lifestyle modifications          Type 2 diabetes mellitus with stage 2 chronic kidney disease, without long-term current use of insulin (Multi)    Overview   - Most recent A1c was 8.0% (5/21/25) up from 7.1% (4/15/24) prior  - Current tx: lifestyle modification, pt DECLINES MEDS   - Historical med: metformin (GI upset), Mounjaro (ran out of it and decided to d/c), Farxiga (too expensive)   - Most recent eGFR was 65 (5/21/25)  - Most recent UACr was 191.5 (4/27/24) up from 154.6 prior   - NOT on aspirin   - NOT on statin (declined)  - NOT on ACE-I (declined)          Current Assessment & Plan   - discussion as detailed above           Other Visit Diagnoses         Encounter for screening mammogram for malignant  neoplasm of breast        Relevant Orders    BI mammo bilateral screening tomosynthesis      Need for shingles vaccine        Relevant Medications    zoster vaccine-recombinant adjuvanted (Shingrix) 50 mcg/0.5 mL vaccine      UTI symptoms        Relevant Orders    Urinalysis with Reflex Culture and Microscopic            Active Problem List  Patient Active Problem List   Diagnosis    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    Chronic gouty arthropathy    Gout    Internal hemorrhoids    Lumbar radiculopathy    Moniliasis skin    Myopia with presbyopia of both eyes    Posterior vitreous detachment of right eye    Pustular folliculitis    Umbilical hernia    Vitamin D deficiency    Hypertension associated with type 2 diabetes mellitus    Renal insufficiency    Class 3 severe obesity due to excess calories with serious comorbidity and body mass index (BMI) of 40.0 to 44.9 in adult    Type 2 diabetes mellitus with hyperglycemia, without long-term current use of insulin    Hyperlipidemia associated with type 2 diabetes mellitus    Annual physical exam    CKD stage G2/A2, GFR 60-89 and albumin creatinine ratio  mg/g    Type 2 diabetes mellitus with stage 2 chronic kidney disease, without long-term current use of insulin (Multi)       Comprehensive Medical/Surgical/Social/Family History  Medical History[1]  Surgical History[2]  Social History     Social History Narrative    Not on file     Tobacco/Alcohol/Opioid use, as well as Illicit Drug Use: no     Allergies and Medications  Patient has no known allergies.  Current Outpatient Medications   Medication Instructions    acetaminophen (TYLENOL ARTHRITIS PAIN) 650 mg, Every 8 hours PRN    ascorbic acid (VITAMIN C ORAL) 1,000 mg, Daily    cholecalciferol, vitamin D3, (VITAMIN D3 ORAL) 2,000 Units, Daily    GARLIC ORAL 1,000 mg, Daily    ZINC ACETATE ORAL 50 mg, Daily    zoster vaccine-recombinant adjuvanted (Shingrix) 50 mcg/0.5 mL vaccine 0.5 mL,  "intramuscular, Once     Medications and Supplements  prescribed by me and other practitioners or clinical pharmacist (such as prescriptions, OTC's, herbal therapies and supplements) were reviewed and documented in the medical record.      Activities of Daily Living  In your present state of health, do you have any difficulty performing the following activities?:   Preparing food and eating?: No  Bathing yourself: No  Getting dressed: No  Using the toilet:No  Moving around from place to place: No  In the past year have you fallen or had a near fall?:No  Able to manage finances independently: Yes  Able to perform grocery shopping: Yes  Able to manage medications independently: Yes  Able to do housework independently: Yes  Patient self-assessment of health status? Good    Patient Care Team:  Shasta Greer PA-C as PCP - General (Family Medicine)     Dietary issues discussed: Yes  Hearing difficulties: No  Safe in current home environment: Yes  Visual Acuity assessed: No  Cognitive Impairment No    ROS otherwise negative aside from what was mentioned above in HPI.    Vitals  /64   Pulse 92   Temp 36.3 °C (97.3 °F)   Resp 18   Ht 1.562 m (5' 1.5\")   Wt 108 kg (238 lb)   SpO2 98%   BMI 44.24 kg/m²   Body mass index is 44.24 kg/m².  Physical Exam  General:  alert, oriented, good hygiene, not disheveled. Well nourished.   Obese   No obvious skin rashes noted.   Normal gait     Mood is pleasant, not tearful, no signs of emotional distress.  Not appearing intoxicated or altered.   No voiced delusions or paranoia,    Normal, appropriate behavior.    HEENT: Normocephalic, atraumatic,   Pupils round, reactive to light  Extraocular motions intact and wnl  Right tympanic membrane normal  Left TM with deep partial ceruman impaction     Neck: No masses palpable.  No carotid bruits.  No thyromegaly.    Respiratory: Equal breath sounds  No wheezes, rales, rhonchi  No respiratory distress.    Heart: Regular rate and " rhythm, no murmurs      Abdomen: no masses palpable, no tenderness, rebound nor guarding. Ventral hernia     Extremities:   B/l lower extremity edema 2+    During the course of the visit the patient was educated and counseled about age appropriate screening and preventive services. Completed preventive screenings were documented in the chart and orders were placed for outstanding screenings/procedures as documented in the Assessment and Plan.    Patient Instructions (the written plan) was given to the patient at check out.      Vicenta Rondon - be aware that any referrals discussed should be placed today and tests that were ordered should result in prompt scheduling today.   If not done today-then a phone call for scheduling is expected in a timely manner (within 2 weeks).   If testing is to be done-a result should be available to patient within 2 weeks time unless otherwise specified.   You, the patient or caregiver, are responsible for making sure that what was discussed is actually scheduled and completed.  If suboptimal understanding of results of tests or referral reason-a follow up appointment with me should be made.  If above does not occur-you are to connect with us for an explanation.         [1] History reviewed. No pertinent past medical history.  [2]   Past Surgical History:  Procedure Laterality Date    CATARACT EXTRACTION Bilateral     Left: 5/20/2019 and right 6/3/2019    CHOLECYSTECTOMY      Cholecystectomy Laparoscopic    CYST REMOVAL

## 2025-05-22 NOTE — ASSESSMENT & PLAN NOTE
- I warned pt about the risks of HLD including atherosclerotic cardiovascular disease risk    - Pt expressed understanding and acceptance of risks and chooses to decline medicine she prefers to attempt lifestyle modifications - diet change, exercise, weight loss

## 2025-05-23 ENCOUNTER — HOSPITAL ENCOUNTER (OUTPATIENT)
Dept: RADIOLOGY | Facility: HOSPITAL | Age: 75
Discharge: HOME | End: 2025-05-23
Payer: MEDICARE

## 2025-05-23 DIAGNOSIS — Z12.31 ENCOUNTER FOR SCREENING MAMMOGRAM FOR MALIGNANT NEOPLASM OF BREAST: ICD-10-CM

## 2025-05-23 PROCEDURE — 77067 SCR MAMMO BI INCL CAD: CPT

## 2026-05-22 ENCOUNTER — APPOINTMENT (OUTPATIENT)
Dept: PRIMARY CARE | Facility: CLINIC | Age: 76
End: 2026-05-22
Payer: MEDICARE